# Patient Record
Sex: FEMALE | Race: WHITE | NOT HISPANIC OR LATINO | Employment: FULL TIME | URBAN - METROPOLITAN AREA
[De-identification: names, ages, dates, MRNs, and addresses within clinical notes are randomized per-mention and may not be internally consistent; named-entity substitution may affect disease eponyms.]

---

## 2017-01-19 ENCOUNTER — TELEPHONE (OUTPATIENT)
Dept: FAMILY MEDICINE | Facility: CLINIC | Age: 42
End: 2017-01-19

## 2017-01-19 NOTE — TELEPHONE ENCOUNTER
Franck Lewis called and scheduled a pap with you.  She is also wanting a IUD placed.  She is thinking the copper one?  I let her know we usually do a separate clinic for that but are you willing to place it in the visit?  Please let me know and I will call her.  I let her know too that you can her can talk about this at the visit prior to placing anything.  Thanks!

## 2017-01-25 NOTE — TELEPHONE ENCOUNTER
Franck Villatoro - if she wants the IUD on my schedule, it'll need to be a 40min visit.  If she wants to wait, and do the IUD and the pap during our GYN clinic, you could talk to Vanessa about rescheduling to a Tuesday afternoon (and if she prefers me to do it, would need to check on when I'm next in GYN clinic).    Hope that helps!    Nneka

## 2017-01-26 NOTE — TELEPHONE ENCOUNTER
I called the patient and left  Message with this info.  Asked her to call back if wanting to schedule the IUD on another day or with Vanessa in procedure.

## 2017-02-02 ENCOUNTER — E-VISIT (OUTPATIENT)
Dept: FAMILY MEDICINE | Facility: CLINIC | Age: 42
End: 2017-02-02

## 2017-02-02 DIAGNOSIS — Z30.09 GENERAL COUNSELING AND ADVICE FOR CONTRACEPTIVE MANAGEMENT: Primary | ICD-10-CM

## 2017-02-06 ENCOUNTER — OFFICE VISIT (OUTPATIENT)
Dept: FAMILY MEDICINE | Facility: CLINIC | Age: 42
End: 2017-02-06

## 2017-02-06 VITALS
HEART RATE: 83 BPM | DIASTOLIC BLOOD PRESSURE: 88 MMHG | HEIGHT: 65 IN | WEIGHT: 178.4 LBS | TEMPERATURE: 97.6 F | RESPIRATION RATE: 16 BRPM | SYSTOLIC BLOOD PRESSURE: 127 MMHG | OXYGEN SATURATION: 97 % | BODY MASS INDEX: 29.72 KG/M2

## 2017-02-06 DIAGNOSIS — Z13.9 SCREENING FOR CONDITION: ICD-10-CM

## 2017-02-06 DIAGNOSIS — Z12.4 SCREENING FOR MALIGNANT NEOPLASM OF CERVIX: ICD-10-CM

## 2017-02-06 DIAGNOSIS — Z97.5 IUD (INTRAUTERINE DEVICE) IN PLACE: ICD-10-CM

## 2017-02-06 DIAGNOSIS — Z30.430 ENCOUNTER FOR INSERTION OF INTRAUTERINE CONTRACEPTIVE DEVICE: Primary | ICD-10-CM

## 2017-02-06 DIAGNOSIS — Z30.430 ENCOUNTER FOR INSERTION OF INTRAUTERINE CONTRACEPTIVE DEVICE: ICD-10-CM

## 2017-02-06 LAB — HCG UR QL: NEGATIVE

## 2017-02-06 NOTE — MR AVS SNAPSHOT
After Visit Summary   2/6/2017    Debbie Hood    MRN: [id number]           Patient Information     Date Of Birth          1975        Visit Information        Provider Department      2/6/2017 2:20 PM Nneka Hylton MD [facility]        Today's Diagnoses     Contraceptive management    -  1     Encounter for insertion of intrauterine contraceptive device         Screening for malignant neoplasm of cervix           Care Instructions    IUD AFTERCARE INSTRUCTIONS     1. Uterine cramping is common after IUD placement. You can help relieve the discomfort with heating pads, Tylenol (acetaminophen), Aspirin or Advil (ibuprofen). If your cramping becomes very painful, please call the clinic.     2. Irregular bleeding and spotting is normal for the first few months after the IUD is placed. In some cases, women may experience irregular bleeding or spotting for up to six months after the IUD is placed. This bleeding can be annoying at first but usually will become lighter with the Mirena IUD quickly. Call the clinic if your bleeding is excessive and not getting better.     3. Your period will likely be shorter and lighter with a Mirena IUD. Approximately 40% of women will stop having periods altogether with the Mirena IUD. Your period may be heavier and longer with the Paragard IUD.     4. IUDs do not protect against sexually transmitted infections including the AIDS virus (HIV), warts (HPV), gonorrhea, Chlamydia, and herpes. Condoms should be used to decrease the risk sexually transmitted infections. If you think that you have been exposed to a sexually transmitted infection, please call the clinic.     5. If you had the IUD placed for birth control, the Paragard IUD is effective immediately. The Mirena IUD is effective immediately if it was inserted within seven days after the start of your period. If you have Mirena inserted at any other time during your menstrual cycle, use  another method of birth control, like condoms for at least 7 days.     6. It is possible for the IUD to come out of the uterus. If it does slip out of place, it is most likely to happen in the first few months after being put in. To make sure your IUD is in place, you can feel for the IUD strings between periods. To check for strings, wash your hands. Then, sit or squat down. Place one finger into your vagina until you feel your cervix. It will feel hard and rubbery, like the end of your nose. The string ends should be coming through your cervix. Do not pull on the strings. If the strings feel much longer than before, if you feel the hard plastic part of the IUD, or if you cannot feel the strings at all, the IUD may have moved out of place. Please call the clinic and consider using a back up form of birth control until you are seen.     7. Keep your follow-up appointment for 4-6 weeks after the IUD has been placed.     8. Pregnancy is unlikely after IUD placement, but can happen. If you have early pregnancy symptoms like nausea and vomiting, breast tenderness, frequent urination or abdominal pain, you can take a pregnancy test. Please call the clinic if you have any concerns or if your pregnancy test is positive.     9. The IUD should only be removed by a healthcare provider.     The Mirena IUD should be removed and/or replaced after 5 years.     The Paragard IUD should be removed and/or replaced after 10 years.     Warning Signs   Call the clinic if any of the following occurs:       Severe abdominal pain or cramping       Unusual bleeding       Fever or chills       Foul smelling vaginal discharge       Painful intercourse       Positive pregnancy test.                 Follow-ups after your visit        Who to contact     Please call your clinic at [phone] to:    Ask questions about your health    Make or cancel appointments    Discuss your medicines    Learn about your test results    Speak to your doctor  "  If you have compliments or concerns about an experience at your clinic, or if you wish to file a complaint, please contact [facility] Patient Relations at [phone] or email us at [email or web address]         Additional Information About Your Visit        MyChart Information     [facility] gives you secure access to your electronic health record. If you see a primary care provider, you can also send messages to your care team and make appointments. If you have questions, please call your primary care clinic.  If you do not have a primary care provider, please call [phone] and they will assist you.      [employer] assistant is an electronic gateway that provides easy, online access to your medical records. With [employer] assistant, you can request a clinic appointment, read your test results, renew a prescription or communicate with your care team.     To access your existing account, please contact your [facility] or call [phone] for assistance.        Care EveryWhere ID     This is your Care EveryWhere ID. This could be used by other organizations to access your [facility] medical records  [id number]        Your Vitals Were     Pulse Temperature Respirations Height BMI (Body Mass Index) Pulse Oximetry    83 97.6  F (36.4  C) (Oral) 16 5' 5\" (165.1 cm) 29.69 kg/m2 97%       Blood Pressure from Last 3 Encounters:   02/06/17 127/88   02/25/16 126/89   01/17/16 134/94    Weight from Last 3 Encounters:   02/06/17 178 lb 6.4 oz (80.922 kg)   02/25/16 172 lb 6.4 oz (78.2 kg)   01/11/16 193 lb (87.544 kg)              We Performed the Following     HC LEVONORGESTREL IU 52MG 5 YR     HCG Qualitative Urine (UPT) (Elif's)     HPV High Risk Types DNA Cervical     INSERTION INTRAUTERINE DEVICE     Pap imaged thin layer screen with HPV - recommended age 30 - 65 years (select HPV order below)          Today's Medication Changes          These changes are accurate as of: " 2/6/17  3:15 PM.  If you have any questions, ask your nurse or doctor.               Start taking these medicines.        Dose/Directions    levonorgestrel 20 MCG/24HR IUD   Commonly known as:  MIRENA   Used for:  Encounter for insertion of intrauterine contraceptive device   Started by:  Nneka Hylton MD        Dose:  1 each   1 each (20 mcg) by Intrauterine route continuous   Refills:  0            Where to get your medicines      Some of these will need a paper prescription and others can be bought over the counter.  Ask your nurse if you have questions.     You don't need a prescription for these medications    - levonorgestrel 20 MCG/24HR IUD             Primary Care Provider Office Phone # Fax #    Nneka Hylton [phone] [address] [facility] [address]        Thank you!     Thank you for choosing [facility]  for your care. Our goal is always to provide you with excellent care. Hearing back from our patients is one way we can continue to improve our services. Please take a few minutes to complete the written survey that you may receive in the mail after your visit with us. Thank you!             Your Updated Medication List - Protect others around you: Learn how to safely use, store and throw away your medicines at www.disposemymeds.org.          This list is accurate as of: 2/6/17  3:15 PM.  Always use your most recent med list.                   Brand Name Dispense Instructions for use    docusate sodium 100 MG capsule    COLACE    100 capsule    Take 1 capsule (100 mg) by mouth nightly as needed for constipation       FLONASE 50 MCG/ACT spray   Generic drug:  fluticasone     1 Package    Spray 1-2 sprays into both nostrils daily       ibuprofen 600 MG tablet    ADVIL/MOTRIN    60 tablet    Take 1 tablet (600 mg) by mouth every 6 hours as needed for moderate pain       levonorgestrel 20 MCG/24HR IUD    MIRENA     1 each (20 mcg) by Intrauterine  route continuous       order for DME     1 Units    Equipment being ordered: BP cuff. Measure daily and if 160/110 go to hospital.       prenatal multivitamin  plus iron 27-0.8 MG Tabs per tablet     100 tablet    Take 1 tablet by mouth daily       ZANTAC PO      Take 150 mg by mouth 2 times daily

## 2017-02-06 NOTE — PATIENT INSTRUCTIONS
IUD AFTERCARE INSTRUCTIONS     1. Uterine cramping is common after IUD placement. You can help relieve the discomfort with heating pads, Tylenol (acetaminophen), Aspirin or Advil (ibuprofen). If your cramping becomes very painful, please call the clinic.     2. Irregular bleeding and spotting is normal for the first few months after the IUD is placed. In some cases, women may experience irregular bleeding or spotting for up to six months after the IUD is placed. This bleeding can be annoying at first but usually will become lighter with the Mirena IUD quickly. Call the clinic if your bleeding is excessive and not getting better.     3. Your period will likely be shorter and lighter with a Mirena IUD. Approximately 40% of women will stop having periods altogether with the Mirena IUD. Your period may be heavier and longer with the Paragard IUD.     4. IUDs do not protect against sexually transmitted infections including the AIDS virus (HIV), warts (HPV), gonorrhea, Chlamydia, and herpes. Condoms should be used to decrease the risk sexually transmitted infections. If you think that you have been exposed to a sexually transmitted infection, please call the clinic.     5. If you had the IUD placed for birth control, the Paragard IUD is effective immediately. The Mirena IUD is effective immediately if it was inserted within seven days after the start of your period. If you have Mirena inserted at any other time during your menstrual cycle, use another method of birth control, like condoms for at least 7 days.     6. It is possible for the IUD to come out of the uterus. If it does slip out of place, it is most likely to happen in the first few months after being put in. To make sure your IUD is in place, you can feel for the IUD strings between periods. To check for strings, wash your hands. Then, sit or squat down. Place one finger into your vagina until you feel your cervix. It will feel hard and rubbery, like the end of  your nose. The string ends should be coming through your cervix. Do not pull on the strings. If the strings feel much longer than before, if you feel the hard plastic part of the IUD, or if you cannot feel the strings at all, the IUD may have moved out of place. Please call the clinic and consider using a back up form of birth control until you are seen.     7. Keep your follow-up appointment for 4-6 weeks after the IUD has been placed.     8. Pregnancy is unlikely after IUD placement, but can happen. If you have early pregnancy symptoms like nausea and vomiting, breast tenderness, frequent urination or abdominal pain, you can take a pregnancy test. Please call the clinic if you have any concerns or if your pregnancy test is positive.     9. The IUD should only be removed by a healthcare provider.     The Mirena IUD should be removed and/or replaced after 5 years.     The Paragard IUD should be removed and/or replaced after 10 years.     Warning Signs   Call the clinic if any of the following occurs:       Severe abdominal pain or cramping       Unusual bleeding       Fever or chills       Foul smelling vaginal discharge       Painful intercourse       Positive pregnancy test.

## 2017-02-06 NOTE — PROGRESS NOTES
"SOAP Note     HPI: here for f/u on a few issues  1) due for pap    2) wants IUD again, thinking Mirena, likely not to have more kids at all    3) f/u on BP  BPs at home  124-137/  Only on lisinopril for a total of 4 months after 1st baby, then BP normalized and her weight was down, things got better.  Elevated BP again in second pregnancy (now 9 months PP) and not on meds currently  - denies HA, CP, leg swelling, vision changes  - weight is a little higher than she would like, needs to get back to exercising regularly    4) FHx of Graves' and other thyroid issues, would like to check TSH (last checked 4 yrs ago - normal)  - due for lipids, too  TSH with lipids - future labs    O: /88 mmHg  Pulse 83  Temp(Src) 97.6  F (36.4  C) (Oral)  Resp 16  Ht 5' 5\" (165.1 cm)  Wt 178 lb 6.4 oz (80.922 kg)  BMI 29.69 kg/m2  SpO2 97%  GEN - NAD  CV - RRR, no murmur  Lungs - CTAB  LE - no edema  Psych - no issues, mood stable    A/P:  1) Pap/IUD today (see IUD note below)  2) Essential HTN - discussed restarting med (she would like to hold off for now), trial of DASH diet, increased activity, weight loss  3) check lipids/TSH at future visit (when fasting)      IUD Insertion Note    Debbie Hood is a patient of Nneka Pandey here for IUD placement.   Indication: unwanted fertility    Counselling: Discussed potential side effects of Paragard, including increased bleeding and cramping, as well as the Mirena, including unpredictable spotting and amenorrhea.  Patient aware to check for strings every month.    Consent: Affirmation of informed consent was signed and scanned into the medical record. Risks, benefits and alternatives were discussed including small risk of uterine perforation during insertion. Patient's questions were elicited and answered.   Procedure safety checklist was completed:  Yes  Time Out (Pause for the Cause) completed: Yes    Labs: UPT negative    Patient chooses this IUD type: " Mirena    Technique:   Patient was premedicated with ibuprofen:   No  Uterine position was confirmed by bimanual exam: Yes - retroverted  Using a sterile speculum and equipment, the cervix  was examined.   A GC-Chlamydia probe was collected:   No   A pap was collected (for screening)   Yes  The cervix was cleansed with Betadine prep. Tenaculum was applied to cervix with tension to straighten cervical canal. The uterus was sounded to 8 cm. The Mirena insertion apparatus was prepared and introduced into the cervix without significant resistance.  The IUD was placed in the uterus. The strings were trimmed 3 cm below the cervix.   IUD Lot #: [id number] (6/2019)  EBL: minimal  Complications: No  Tolerance: Pt tolerated procedure well and was in stable condition.     Follow up: Pt was instructed to call if heavy bleeding, severe cramping or foul smelling discharge. May take 400-600 mg ibuprofen TID prn for mild cramping. Pt should return in one month for IUD string check. Pt instructed she requires a new IUD device in 7 years.     Nneka Hylton MD

## 2017-02-08 LAB
COPATH REPORT: NORMAL
PAP: NORMAL

## 2017-02-09 LAB
FINAL DIAGNOSIS: NORMAL
HPV HR 12 DNA CVX QL NAA+PROBE: NEGATIVE
HPV16 DNA SPEC QL NAA+PROBE: NEGATIVE
HPV18 DNA SPEC QL NAA+PROBE: NEGATIVE
SPECIMEN DESCRIPTION: NORMAL

## 2017-02-20 DIAGNOSIS — Z13.9 SCREENING FOR CONDITION: ICD-10-CM

## 2017-02-20 LAB
CHOLEST SERPL-MCNC: 173.8 MG/DL (ref 0–200)
CHOLEST/HDLC SERPL: 3.5 {RATIO} (ref 0–5)
HDLC SERPL-MCNC: 50.3 MG/DL
LDLC SERPL CALC-MCNC: 92 MG/DL (ref 0–129)
TRIGL SERPL-MCNC: 155.2 MG/DL (ref 0–150)
TSH SERPL DL<=0.005 MIU/L-ACNC: 3.43 MU/L (ref 0.4–4)
VLDL CHOLESTEROL: 31 MG/DL (ref 7–32)

## 2017-03-30 ENCOUNTER — OFFICE VISIT (OUTPATIENT)
Dept: FAMILY MEDICINE | Facility: CLINIC | Age: 42
End: 2017-03-30

## 2017-03-30 VITALS
OXYGEN SATURATION: 96 % | HEART RATE: 108 BPM | SYSTOLIC BLOOD PRESSURE: 125 MMHG | TEMPERATURE: 97.8 F | RESPIRATION RATE: 18 BRPM | WEIGHT: 179 LBS | DIASTOLIC BLOOD PRESSURE: 89 MMHG | BODY MASS INDEX: 29.79 KG/M2

## 2017-03-30 DIAGNOSIS — R22.2 SUBCUTANEOUS NODULE OF CHEST WALL: Primary | ICD-10-CM

## 2017-03-30 NOTE — MR AVS SNAPSHOT
After Visit Summary   3/30/2017    Debbie Hood    MRN: [id number]           Patient Information     Date Of Birth          1975        Visit Information        Provider Department      3/30/2017 4:20 PM Nneka Hylton MD [facility]        Today's Diagnoses     Subcutaneous nodule of chest wall    -  1       Follow-ups after your visit        Who to contact     Please call your clinic at [phone] to:    Ask questions about your health    Make or cancel appointments    Discuss your medicines    Learn about your test results    Speak to your doctor   If you have compliments or concerns about an experience at your clinic, or if you wish to file a complaint, please contact [facility] Patient Relations at [phone] or email us at [email or web address]         Additional Information About Your Visit        MyChart Information     [employer] gives you secure access to your electronic health record. If you see a primary care provider, you can also send messages to your care team and make appointments. If you have questions, please call your primary care clinic.  If you do not have a primary care provider, please call [phone] and they will assist you.      [employer] is an electronic gateway that provides easy, online access to your medical records. With [employer], you can request a clinic appointment, read your test results, renew a prescription or communicate with your care team.     To access your existing account, please contact your [facility] or call [phone] for assistance.        Care EveryWhere ID     This is your Care EveryWhere ID. This could be used by other organizations to access your [facility] medical records  [id number]        Your Vitals Were     Pulse Temperature Respirations Pulse Oximetry Breastfeeding? BMI (Body Mass Index)    108 97.8  F (36.6  C) (Oral) 18 96% No 29.79 kg/m2        Blood Pressure from Last 3 Encounters:   03/30/17 125/89   02/06/17 127/88   02/25/16 126/89    Weight from Last 3 Encounters:   03/30/17 179 lb (81.2 kg)   02/06/17 178 lb 6.4 oz (80.9 kg)   02/25/16 172 lb 6.4 oz (78.2 kg)              Today, you had the following     No orders found for display       Primary Care Provider Office Phone # Fax #    Nneka Hylton [phone] [address] [facility] [address]        Thank you!     Thank you for choosing [facility]  for your care. Our goal is always to provide you with excellent care. Hearing back from our patients is one way we can continue to improve our services. Please take a few minutes to complete the written survey that you may receive in the mail after your visit with us. Thank you!             Your Updated Medication List - Protect others around you: Learn how to safely use, store and throw away your medicines at www.disposemymeds.org.          This list is accurate as of: 3/30/17 11:59 PM.  Always use your most recent med list.                   Brand Name Dispense Instructions for use    docusate sodium 100 MG capsule    COLACE    100 capsule    Take 1 capsule (100 mg) by mouth nightly as needed for constipation       FLONASE 50 MCG/ACT spray   Generic drug:  fluticasone     1 Package    Spray 1-2 sprays into both nostrils daily       ibuprofen 600 MG tablet    ADVIL/MOTRIN    60 tablet    Take 1 tablet (600 mg) by mouth every 6 hours as needed for moderate pain       levonorgestrel 20 MCG/24HR IUD    MIRENA     1 each (20 mcg) by Intrauterine route continuous       order for DME     1 Units    Equipment being ordered: BP cuff. Measure daily and if 160/110 go to hospital.       prenatal multivitamin  plus iron 27-0.8 MG Tabs per tablet     100 tablet    Take 1 tablet by mouth daily       ZANTAC PO      Take 150 mg by mouth 2 times daily

## 2017-03-30 NOTE — PROGRESS NOTES
HPI:       Debbie Hood is a 41 year old who presents for the following  Patient presents with:  Mass: near breast        Concern: Breast Lump - though not sure if it's technically on the breast   Description of the problem:  Pt presents today with a small lump below right breast pt denies any pain, redness or swelling     When did it start?: 6+ months, hasn't changed, thought she should get it looked at finally    Intensity: not painful    Progression of Symptoms:  same    Therapies Tried Nothing      H/o BrCA in PGM (late 70s, early 80s)  No BrCA on mom's side of the family    Problem, Medication and Allergy Lists were reviewed and are current.  Patient is an established patient of this clinic.         Review of Systems:   Review of Systems   Constitutional: Negative for activity change and appetite change.   Skin:        See HPI - breast lump or chest wall lump   Neurological: Negative.    Psychiatric/Behavioral: Negative.              Physical Exam:   Patient Vitals for the past 24 hrs:   BP Temp Temp src Pulse Resp SpO2 Weight   03/30/17 1610 125/89 - - - - - -   03/30/17 1609 141/88 97.8  F (36.6  C) Oral 108 18 96 % 179 lb (81.2 kg)     Body mass index is 29.79 kg/(m^2).  Vitals were reviewed and were normal     Physical Exam   Constitutional: She is oriented to person, place, and time. She appears well-developed and well-nourished.   Pulmonary/Chest: Effort normal.       Musculoskeletal: Normal range of motion.   Neurological: She is alert and oriented to person, place, and time.   Skin:   Small (5mm), mobile, smooth, superficial subcutaneous nodule just below the R breast (does not appear to be near breast tissue)  Normal breast exam otherwise   Psychiatric: She has a normal mood and affect. Her behavior is normal. Judgment and thought content normal.         Results:       Assessment and Plan     1. Subcutaneous nodule of chest wall  Reassurance  Not associated with breast tissue  Hasn't changed  "in 6 months - likely is a cyst or fibroma  No need to do anything with it - if changes size, reassess    Discussed breast cancer screening guidelines - her h/o is not considered \"high risk\" for BrCA - can start at age 50, but offered to refer in 40s if she prefers (understanding limitations of screening and possible false-positive in 40s)    There are no discontinued medications.  Options for treatment and follow-up care were reviewed with the patient. Debbie Hood  engaged in the decision making process and verbalized understanding of the options discussed and agreed with the final plan.    Nneka Hylton MD      "

## 2017-03-31 ASSESSMENT — ENCOUNTER SYMPTOMS
PSYCHIATRIC NEGATIVE: 1
APPETITE CHANGE: 0
NEUROLOGICAL NEGATIVE: 1
ACTIVITY CHANGE: 0

## 2017-05-09 ENCOUNTER — OFFICE VISIT (OUTPATIENT)
Dept: FAMILY MEDICINE | Facility: CLINIC | Age: 42
End: 2017-05-09

## 2017-05-09 VITALS
HEART RATE: 92 BPM | RESPIRATION RATE: 16 BRPM | SYSTOLIC BLOOD PRESSURE: 127 MMHG | DIASTOLIC BLOOD PRESSURE: 86 MMHG | TEMPERATURE: 98.4 F | OXYGEN SATURATION: 97 % | BODY MASS INDEX: 29.59 KG/M2 | WEIGHT: 177.8 LBS

## 2017-05-09 DIAGNOSIS — T83.32XA MALPOSITIONED INTRAUTERINE DEVICE, INITIAL ENCOUNTER: Primary | ICD-10-CM

## 2017-05-09 DIAGNOSIS — Z30.432 ENCOUNTER FOR IUD REMOVAL: ICD-10-CM

## 2017-05-09 ASSESSMENT — ENCOUNTER SYMPTOMS
RESPIRATORY NEGATIVE: 1
CONSTITUTIONAL NEGATIVE: 1
ABDOMINAL PAIN: 0
CARDIOVASCULAR NEGATIVE: 1

## 2017-05-09 NOTE — Clinical Note
JASWANT Lewis was in today and her IUD was removed due to expulsion.  She will be following up in gyn clinic for replacement.  Thanks, Nicolette

## 2017-05-09 NOTE — PROGRESS NOTES
HPI:       Debbie Hood is a 41 year old who presents for the following  Patient presents with:  IUD: check up on IUD. mild nausea. She can feel the strings and is concerned      LMP:  17, period was heavy but not abnormal - checked IUD strings after menses and was able to feel base of IUD over the weekend.      Mirena IUD placed on 17.  Had Paraguard previously between birth of her children.      .     No heavy bleeding or cramping.          Problem, Medication and Allergy Lists were reviewed and are current.  Patient is an established patient of this clinic.             Review of Systems:   Review of Systems   Constitutional: Negative.    Respiratory: Negative.    Cardiovascular: Negative.    Gastrointestinal: Negative for abdominal pain.   Genitourinary: Negative for menstrual problem, pelvic pain, vaginal bleeding and vaginal pain.             Physical Exam:   Patient Vitals for the past 24 hrs:   BP Temp Temp src Pulse Resp SpO2 Weight   17 0910 127/86 98.4  F (36.9  C) Oral 92 16 97 % 177 lb 12.8 oz (80.6 kg)     Body mass index is 29.59 kg/(m^2).  Vitals were reviewed and were normal     Physical Exam   Constitutional: She appears well-nourished. No distress.   Genitourinary: Uterus is not tender. Cervix exhibits no discharge and no friability.   Genitourinary Comments: Base of IUD visualized extending out of cervical os.    IUD removed without complication utilizing ring forceps.           Assessment and Plan     Debbie was seen today for iud.    Diagnoses and all orders for this visit:    Malpositioned intrauterine device, initial encounter (H)  -     Colposcopy/[facility]necology Clinic[facility]ospit[facility] INTERNAL REFERRAL    Encounter for IUD removal  Mirena IUD removed due to malpositioning/expulsion of IUD.      Patient to follow-up in gyn procedure clinic for replacement of IUD.        Options for treatment and follow-up care were reviewed with the patient. Debbie Hood  engaged  in the decision making process and verbalized understanding of the options discussed and agreed with the final plan.    STEVE Campos CNP

## 2017-05-09 NOTE — PATIENT INSTRUCTIONS
Here is the plan from today's visit    1. Malpositioned intrauterine device, initial encounter (H)  - Colposcopy/Gynecology Clinic-[facility]ospit[facility] INTERNAL REFERRAL    2. Encounter for IUD removal          Thank you for coming to [facility] today.  Lab Testing:  **If you had lab testing today and your results are reassuring or normal they will be mailed to you or sent through T[employer] within 7 days.   **If the lab tests need quick action we will call you with the results.  The phone number we will call with results is # [phone] (home) [phone] (work). If this is not the best number please call our clinic and change the number.  Medication Refills:  If you need any refills please call your pharmacy and they will contact us.   If you need to  your refill at a new pharmacy, please contact the new pharmacy directly. The new pharmacy will help you get your medications transferred faster.   Scheduling:  If you have any concerns about today's visit or wish to schedule another appointment please call our office during normal business hours [phone] (8-5:00 M-F)  If a referral was made to a [facility] and you don't get a call from central scheduling please call [phone].  If a Mammogram was ordered for you at [facility] call [phone] to schedule or change your appointment.  If you had an XRay/CT/Ultrasound/MRI ordered the number is [phone] to schedule or change your radiology appointment.   Medical Concerns:  If you have urgent medical concerns please call [phone] at any time of the day.  If you have a medical emergency please call [phone].

## 2017-05-09 NOTE — MR AVS SNAPSHOT
After Visit Summary   5/9/2017    Debbie Hood    MRN: [id number]           Patient Information     Date Of Birth          1975        Visit Information        Provider Department      5/9/2017 9:20 AM Nicolette Martin APRN CNP [facility]        Today's Diagnoses     Malpositioned intrauterine device, initial encounter (H)    -  1    Encounter for IUD removal          Care Instructions    Here is the plan from today's visit    1. Malpositioned intrauterine device, initial encounter (H)  - Colposcopy/Gynecology Clinic-[facility] INTERNAL REFERRAL    2. Encounter for IUD removal          Thank you for coming to [facility] today.  Lab Testing:  **If you had lab testing today and your results are reassuring or normal they will be mailed to you or sent through MD.Voice within 7 days.   **If the lab tests need quick action we will call you with the results.  The phone number we will call with results is # [phone] (home) [phone] (work). If this is not the best number please call our clinic and change the number.  Medication Refills:  If you need any refills please call your pharmacy and they will contact us.   If you need to  your refill at a new pharmacy, please contact the new pharmacy directly. The new pharmacy will help you get your medications transferred faster.   Scheduling:  If you have any concerns about today's visit or wish to schedule another appointment please call our office during normal business hours [phone] (8-5:00 M-F)  If a referral was made to a [facility] and you don't get a call from central scheduling please call [phone].  If a Mammogram was ordered for you at [facility] call [phone] to schedule or change your appointment.  If you had an XRay/CT/Ultrasound/MRI ordered the number is [phone] to schedule or change your radiology appointment.   Medical Concerns:  If you have urgent  medical concerns please call [phone] at any time of the day.  If you have a medical emergency please call 911.          Follow-ups after your visit        Additional Services     Colposcopy/Gynecology Clinic-[facility] INTERNAL REFERRAL       What procedure: IUD placement  Urgency of Appointment: Next Available  Would this patient benefit from pre-medication with Ativan for procedural anxiety? No  Is this patient post-menopausal? No                  Who to contact     Please call your clinic at [phone] to:    Ask questions about your health    Make or cancel appointments    Discuss your medicines    Learn about your test results    Speak to your doctor   If you have compliments or concerns about an experience at your clinic, or if you wish to file a complaint, please contact [facility] Patient Relations at [phone] or email us at [email or web address]         Additional Information About Your Visit        [employer] Information     [employer] gives you secure access to your electronic health record. If you see a primary care provider, you can also send messages to your care team and make appointments. If you have questions, please call your primary care clinic.  If you do not have a primary care provider, please call [phone] and they will assist you.      [employer] is an electronic gateway that provides easy, online access to your medical records. With [employer], you can request a clinic appointment, read your test results, renew a prescription or communicate with your care team.     To access your existing account, please contact your [facility] or call [phone] for assistance.        Care EveryWhere ID     This is your Care EveryWhere ID. This could be used by other organizations to access your [place] medical records  [id number]        Your Vitals Were     Pulse Temperature Respirations Last Period Pulse Oximetry BMI (Body Mass  Index)    92 98.4  F (36.9  C) (Oral) 16 05/02/2017 (Exact Date) 97% 29.59 kg/m2       Blood Pressure from Last 3 Encounters:   05/09/17 127/86   03/30/17 125/89   02/06/17 127/88    Weight from Last 3 Encounters:   05/09/17 177 lb 12.8 oz (80.6 kg)   03/30/17 179 lb (81.2 kg)   02/06/17 178 lb 6.4 oz (80.9 kg)              We Performed the Following     Colposcopy/Gynecology Clinic-[facility] INTERNAL REFERRAL        Primary Care Provider Office Phone # Fax #    Nneka Hylton [phone] [address] [facility] [address]        Thank you!     Thank you for choosing [facility]  for your care. Our goal is always to provide you with excellent care. Hearing back from our patients is one way we can continue to improve our services. Please take a few minutes to complete the written survey that you may receive in the mail after your visit with us. Thank you!             Your Updated Medication List - Protect others around you: Learn how to safely use, store and throw away your medicines at www.disposemymeds.org.          This list is accurate as of: 5/9/17  9:41 AM.  Always use your most recent med list.                   Brand Name Dispense Instructions for use    docusate sodium 100 MG capsule    COLACE    100 capsule    Take 1 capsule (100 mg) by mouth nightly as needed for constipation       FLONASE 50 MCG/ACT spray   Generic drug:  fluticasone     1 Package    Spray 1-2 sprays into both nostrils daily Reported on 5/9/2017       ibuprofen 600 MG tablet    ADVIL/MOTRIN    60 tablet    Take 1 tablet (600 mg) by mouth every 6 hours as needed for moderate pain       levonorgestrel 20 MCG/24HR IUD    MIRENA     1 each (20 mcg) by Intrauterine route continuous       order for DME     1 Units    Equipment being ordered: BP cuff. Measure daily and if 160/110 go to hospital.       prenatal multivitamin  plus iron 27-0.8 MG Tabs per tablet     100 tablet    Take 1 tablet by  mouth daily       ZANTAC PO      Take 150 mg by mouth 2 times daily Reported on 5/9/2017

## 2017-05-23 ENCOUNTER — OFFICE VISIT (OUTPATIENT)
Dept: FAMILY MEDICINE | Facility: CLINIC | Age: 42
End: 2017-05-23

## 2017-05-23 DIAGNOSIS — Z30.430 ENCOUNTER FOR INSERTION OF INTRAUTERINE CONTRACEPTIVE DEVICE: Primary | ICD-10-CM

## 2017-05-23 DIAGNOSIS — Z97.5 IUD (INTRAUTERINE DEVICE) IN PLACE: ICD-10-CM

## 2017-05-23 LAB — HCG UR QL: NEGATIVE

## 2017-05-23 NOTE — MR AVS SNAPSHOT
After Visit Summary   5/23/2017    Debbie Hood    MRN: [id number]           Patient Information     Date Of Birth          1975        Visit Information        Provider Department      5/23/2017 2:20 PM Alice Luna MD [facility]        Today's Diagnoses     Encounter for birth control    -  1      Care Instructions    Here is the plan from today's visit    1. Encounter for birth control  - HCG Qualitative Urine (UPT) ([facility])      IUD AFTERCARE INSTRUCTIONS     1. Uterine cramping is common after IUD placement. You can help relieve the discomfort with heating pads, Tylenol (acetaminophen), Aspirin or Advil (ibuprofen). If your cramping becomes very painful, please call the clinic.     2. Irregular bleeding and spotting is normal for the first few months after the IUD is placed. In some cases, women may experience irregular bleeding or spotting for up to six months after the IUD is placed. This bleeding can be annoying at first but usually will become lighter with the Mirena IUD quickly. Call the clinic if your bleeding is excessive and not getting better.     3. Your period will likely be shorter and lighter with a Mirena IUD. Approximately 40% of women will stop having periods altogether with the Mirena IUD. Your period may be heavier and longer with the Paragard IUD.     4. IUDs do not protect against sexually transmitted infections including the AIDS virus (HIV), warts (HPV), gonorrhea, Chlamydia, and herpes. Condoms should be used to decrease the risk sexually transmitted infections. If you think that you have been exposed to a sexually transmitted infection, please call the clinic.     5. If you had the IUD placed for birth control, the Paragard IUD is effective immediately. The Mirena IUD is effective immediately if it was inserted within seven days after the start of your period. If you have Mirena inserted at any other time during your menstrual cycle, use  another method of birth control, like condoms for at least 7 days.     6. It is possible for the IUD to come out of the uterus. If it does slip out of place, it is most likely to happen in the first few months after being put in. To make sure your IUD is in place, you can feel for the IUD strings between periods. To check for strings, wash your hands. Then, sit or squat down. Place one finger into your vagina until you feel your cervix. It will feel hard and rubbery, like the end of your nose. The string ends should be coming through your cervix. Do not pull on the strings. If the strings feel much longer than before, if you feel the hard plastic part of the IUD, or if you cannot feel the strings at all, the IUD may have moved out of place. Please call the clinic and consider using a back up form of birth control until you are seen.     7. Keep your follow-up appointment for 4-6 weeks after the IUD has been placed.     8. Pregnancy is unlikely after IUD placement, but can happen. If you have early pregnancy symptoms like nausea and vomiting, breast tenderness, frequent urination or abdominal pain, you can take a pregnancy test. Please call the clinic if you have any concerns or if your pregnancy test is positive.     9. The IUD should only be removed by a healthcare provider.     The Mirena IUD should be removed and/or replaced after 5 years.     The Paragard IUD should be removed and/or replaced after 10 years.     Warning Signs   Call the clinic if any of the following occurs:       Severe abdominal pain or cramping       Unusual bleeding       Fever or chills       Foul smelling vaginal discharge       Painful intercourse       Positive pregnancy test.               Please call or return to clinic if your symptoms don't go away.    Follow up plan  Please make a clinic appointment for follow up with your primary physician Nneka Hylton in 1 week for string check.    Thank you for coming to [facility]  today.  Lab Testing:  **If you had lab testing today and your results are reassuring or normal they will be mailed to you or sent through [employer] within 7 days.   **If the lab tests need quick action we will call you with the results.  The phone number we will call with results is # [phone] (home) [phone] (work). If this is not the best number please call our clinic and change the number.  Medication Refills:  If you need any refills please call your pharmacy and they will contact us.   If you need to  your refill at a new pharmacy, please contact the new pharmacy directly. The new pharmacy will help you get your medications transferred faster.   Scheduling:  If you have any concerns about today's visit or wish to schedule another appointment please call our office during normal business hours [phone] (8-5:00 M-F)    Ray/CT/Ultrasound/MRI ordered the number is [phone] to schedule or change your radiology appointment.   Medical Concerns:  If you have urgent medical concerns please call [phone] at any time of the day.  If you have a medical emergency please call 911.          Follow-ups after your visit        Who to contact     Please call your clinic at [phone] to:    Ask questions about your health    Make or cancel appointments    Discuss your medicines    Learn about your test results    Speak to your doctor   If you have compliments or concerns about an experience at your clinic, or if you wish to file a complaint, please contact [facility] Patient Relations at [phone] or email us at [email or web address]         Additional Information About Your Visit        [employer] Information     [employer] gives you secure access to your electronic health record. If you see a primary care provider, you can also send messages to your care team and make appointments. If you have questions, please call your primary care clinic.  If you do not have a  primary care provider, please call [phone] and they will assist you.      [employer] is an electronic gateway that provides easy, online access to your medical records. With [employer], you can request a clinic appointment, read your test results, renew a prescription or communicate with your care team.     To access your existing account, please contact your [facility] or call [phone] for assistance.        Care EveryWhere ID     This is your Care EveryWhere ID. This could be used by other organizations to access your [place] medical records  [id number]        Your Vitals Were     Last Period                   05/02/2017 (Exact Date)            Blood Pressure from Last 3 Encounters:   05/09/17 127/86   03/30/17 125/89   02/06/17 127/88    Weight from Last 3 Encounters:   05/09/17 177 lb 12.8 oz (80.6 kg)   03/30/17 179 lb (81.2 kg)   02/06/17 178 lb 6.4 oz (80.9 kg)              We Performed the Following     HCG Qualitative Urine (UPT) ([facility])        Primary Care Provider Office Phone # Fax #    Nneka Hylton [phone] [address] [facility] [address]        Thank you!     Thank you for choosing [facility]  for your care. Our goal is always to provide you with excellent care. Hearing back from our patients is one way we can continue to improve our services. Please take a few minutes to complete the written survey that you may receive in the mail after your visit with us. Thank you!             Your Updated Medication List - Protect others around you: Learn how to safely use, store and throw away your medicines at www.disposemymeds.org.          This list is accurate as of: 5/23/17  3:18 PM.  Always use your most recent med list.                   Brand Name Dispense Instructions for use    docusate sodium 100 MG capsule    COLACE    100 capsule    Take 1 capsule (100 mg) by mouth nightly as needed for constipation        FLONASE 50 MCG/ACT spray   Generic drug:  fluticasone     1 Package    Spray 1-2 sprays into both nostrils daily Reported on 5/9/2017       ibuprofen 600 MG tablet    ADVIL/MOTRIN    60 tablet    Take 1 tablet (600 mg) by mouth every 6 hours as needed for moderate pain       levonorgestrel 20 MCG/24HR IUD    MIRENA     1 each (20 mcg) by Intrauterine route continuous       order for DME     1 Units    Equipment being ordered: BP cuff. Measure daily and if 160/110 go to hospital.       prenatal multivitamin  plus iron 27-0.8 MG Tabs per tablet     100 tablet    Take 1 tablet by mouth daily       ZANTAC PO      Take 150 mg by mouth 2 times daily Reported on 5/9/2017

## 2017-05-23 NOTE — PATIENT INSTRUCTIONS
Here is the plan from today's visit    1. Encounter for birth control  - HCG Qualitative Urine (UPT) ([facility])      IUD AFTERCARE INSTRUCTIONS     1. Uterine cramping is common after IUD placement. You can help relieve the discomfort with heating pads, Tylenol (acetaminophen), Aspirin or Advil (ibuprofen). If your cramping becomes very painful, please call the clinic.     2. Irregular bleeding and spotting is normal for the first few months after the IUD is placed. In some cases, women may experience irregular bleeding or spotting for up to six months after the IUD is placed. This bleeding can be annoying at first but usually will become lighter with the Mirena IUD quickly. Call the clinic if your bleeding is excessive and not getting better.     3. Your period will likely be shorter and lighter with a Mirena IUD. Approximately 40% of women will stop having periods altogether with the Mirena IUD. Your period may be heavier and longer with the Paragard IUD.     4. IUDs do not protect against sexually transmitted infections including the AIDS virus (HIV), warts (HPV), gonorrhea, Chlamydia, and herpes. Condoms should be used to decrease the risk sexually transmitted infections. If you think that you have been exposed to a sexually transmitted infection, please call the clinic.     5. If you had the IUD placed for birth control, the Paragard IUD is effective immediately. The Mirena IUD is effective immediately if it was inserted within seven days after the start of your period. If you have Mirena inserted at any other time during your menstrual cycle, use another method of birth control, like condoms for at least 7 days.     6. It is possible for the IUD to come out of the uterus. If it does slip out of place, it is most likely to happen in the first few months after being put in. To make sure your IUD is in place, you can feel for the IUD strings between periods. To check for strings, wash your hands. Then, sit or  squat down. Place one finger into your vagina until you feel your cervix. It will feel hard and rubbery, like the end of your nose. The string ends should be coming through your cervix. Do not pull on the strings. If the strings feel much longer than before, if you feel the hard plastic part of the IUD, or if you cannot feel the strings at all, the IUD may have moved out of place. Please call the clinic and consider using a back up form of birth control until you are seen.     7. Keep your follow-up appointment for 4-6 weeks after the IUD has been placed.     8. Pregnancy is unlikely after IUD placement, but can happen. If you have early pregnancy symptoms like nausea and vomiting, breast tenderness, frequent urination or abdominal pain, you can take a pregnancy test. Please call the clinic if you have any concerns or if your pregnancy test is positive.     9. The IUD should only be removed by a healthcare provider.     The Mirena IUD should be removed and/or replaced after 5 years.     The Paragard IUD should be removed and/or replaced after 10 years.     Warning Signs   Call the clinic if any of the following occurs:       Severe abdominal pain or cramping       Unusual bleeding       Fever or chills       Foul smelling vaginal discharge       Painful intercourse       Positive pregnancy test.               Please call or return to clinic if your symptoms don't go away.    Follow up plan  Please make a clinic appointment for follow up with your primary physician nNeka Hylton in 1 week for string check.    Thank you for coming to [facility] today.  Lab Testing:  **If you had lab testing today and your results are reassuring or normal they will be mailed to you or sent through [employer] within 7 days.   **If the lab tests need quick action we will call you with the results.  The phone number we will call with results is # [phone] (home) [phone] (work). If this is not the best number please call our  clinic and change the number.  Medication Refills:  If you need any refills please call your pharmacy and they will contact us.   If you need to  your refill at a new pharmacy, please contact the new pharmacy directly. The new pharmacy will help you get your medications transferred faster.   Scheduling:  If you have any concerns about today's visit or wish to schedule another appointment please call our office during normal business hours [phone] (8-5:00 M-F)    Ray/CT/Ultrasound/MRI ordered the number is [phone] to schedule or change your radiology appointment.   Medical Concerns:  If you have urgent medical concerns please call [phone] at any time of the day.  If you have a medical emergency please call [phone].

## 2017-11-08 ENCOUNTER — OFFICE VISIT (OUTPATIENT)
Dept: FAMILY MEDICINE | Facility: CLINIC | Age: 42
End: 2017-11-08

## 2017-11-08 VITALS
TEMPERATURE: 97.9 F | SYSTOLIC BLOOD PRESSURE: 126 MMHG | DIASTOLIC BLOOD PRESSURE: 87 MMHG | RESPIRATION RATE: 16 BRPM | OXYGEN SATURATION: 99 % | BODY MASS INDEX: 29.89 KG/M2 | HEART RATE: 78 BPM | WEIGHT: 179.6 LBS

## 2017-11-08 DIAGNOSIS — N89.8 VAGINAL DISCHARGE: ICD-10-CM

## 2017-11-08 DIAGNOSIS — Z23 NEED FOR INFLUENZA VACCINATION: ICD-10-CM

## 2017-11-08 DIAGNOSIS — Z30.431 IUD CHECK UP: Primary | ICD-10-CM

## 2017-11-08 DIAGNOSIS — I10 BENIGN ESSENTIAL HYPERTENSION: ICD-10-CM

## 2017-11-08 LAB
BACTERIA: NORMAL
CLUE CELLS: NORMAL
MOTILE TRICHOMONAS: NEGATIVE
ODOR: NORMAL
PH WET PREP: 4.5
WBC WET PREP: <2
YEAST: NORMAL

## 2017-11-08 RX ORDER — TRIAMCINOLONE ACETONIDE 55 UG/1
2 SPRAY, METERED NASAL DAILY
COMMUNITY

## 2017-11-08 ASSESSMENT — ENCOUNTER SYMPTOMS
FEVER: 0
NAUSEA: 0
COUGH: 0
SHORTNESS OF BREATH: 0
CHILLS: 0
ABDOMINAL PAIN: 0
DYSURIA: 0
DIFFICULTY URINATING: 0
VOMITING: 0

## 2017-11-08 NOTE — PROGRESS NOTES
Preceptor Attestation:   Patient seen and discussed with the resident. Assessment and plan reviewed with resident and agreed upon.   Supervising Physician:  Chan Tompkins MD  [facility]

## 2017-11-08 NOTE — MR AVS SNAPSHOT
After Visit Summary   11/8/2017    Debbie Hood    MRN: [id number]           Patient Information     Date Of Birth          1975        Visit Information        Provider Department      11/8/2017 4:00 PM Nneka Lama MD [facility]        Today's Diagnoses     IUD check up    -  1    Vaginal discharge        Benign essential hypertension          Care Instructions    Here is the plan from today's visit    1. IUD check up  IUD in place, please assess monthly     2. Vaginal discharge  - Wet Prep ([facility])    3. Benign essential hypertension- Controlled on repeat     Please call or return to clinic if your symptoms don't go away.    Follow up plan  Follow up as needed    Thank you for coming to [facility] today.  Lab Testing:  **If you had lab testing today and your results are reassuring or normal they will be mailed to you or sent through [facility] within 7 days.   **If the lab tests need quick action we will call you with the results.  The phone number we will call with results is # [phone] (home) [phone] (work). If this is not the best number please call our clinic and change the number.  Medication Refills:  If you need any refills please call your pharmacy and they will contact us.   If you need to  your refill at a new pharmacy, please contact the new pharmacy directly. The new pharmacy will help you get your medications transferred faster.   Scheduling:  If you have any concerns about today's visit or wish to schedule another appointment please call our office during normal business hours [phone] (8-5:00 M-F)  If a referral was made to a [facility] and you don't get a call from central scheduling please call [phone].  If a Mammogram was ordered for you at [facility] call [phone] to schedule or change your appointment.  If you had an XRay/CT/Ultrasound/MRI ordered the number is [phone] to  schedule or change your radiology appointment.   Medical Concerns:  If you have urgent medical concerns please call [phone] at any time of the day.            Follow-ups after your visit        Who to contact     Please call your clinic at [phone] to:    Ask questions about your health    Make or cancel appointments    Discuss your medicines    Learn about your test results    Speak to your doctor   If you have compliments or concerns about an experience at your clinic, or if you wish to file a complaint, please contact [facility] Patient Relations at [phone] or email us at [email or web address]         Additional Information About Your Visit        Pins Information     Pins gives you secure access to your electronic health record. If you see a primary care provider, you can also send messages to your care team and make appointments. If you have questions, please call your primary care clinic.  If you do not have a primary care provider, please call [phone] and they will assist you.      Pins is an electronic gateway that provides easy, online access to your medical records. With Pins, you can request a clinic appointment, read your test results, renew a prescription or communicate with your care team.     To access your existing account, please contact your [facility] or call [phone] for assistance.        Care EveryWhere ID     This is your Care EveryWhere ID. This could be used by other organizations to access your [facility] medical records  [id number]        Your Vitals Were     Pulse Temperature Respirations Pulse Oximetry Breastfeeding? BMI (Body Mass Index)    78 97.9  F (36.6  C) (Oral) 16 99% No 29.89 kg/m2       Blood Pressure from Last 3 Encounters:   11/08/17 126/87   05/09/17 127/86   03/30/17 125/89    Weight from Last 3 Encounters:   11/08/17 179 lb 9.6 oz (81.5 kg)   05/09/17 177 lb 12.8 oz (80.6  kg)   03/30/17 179 lb (81.2 kg)              We Performed the Following     Wet Prep ([facility])          Today's Medication Changes          These changes are accurate as of: 11/8/17  4:32 PM.  If you have any questions, ask your nurse or doctor.               Stop taking these medicines if you haven't already. Please contact your care team if you have questions.     docusate sodium 100 MG capsule   Commonly known as:  COLACE   Stopped by:  Nneka Lama MD           FLONASE 50 MCG/ACT spray   Generic drug:  fluticasone   Stopped by:  Nneka Lama MD           prenatal multivitamin plus iron 27-0.8 MG Tabs per tablet   Stopped by:  Nneka Lama MD           ZANTAC PO   Stopped by:  Nneka Lama MD                    Primary Care Provider Office Phone # Fax #    Nneka Hylton [phone]       [address] [address]        Equal Access to Services     [facility]: Hadii antoine greene hadasho Soomaali, waaxda luqadaha, qaybta kaalmada adeegyada, james edmondson . So [facility] [phone].    ATENCIÓN: Si yurila espmartine, tiene a townsend disposición servicios gratuitos de asistencia lingüística. [facility] [phone].    We comply with applicable federal civil rights laws and [place] laws. We do not discriminate on the basis of race, color, national origin, age, disability, sex, sexual orientation, or gender identity.            Thank you!     Thank you for choosing [facility]  for your care. Our goal is always to provide you with excellent care. Hearing back from our patients is one way we can continue to improve our services. Please take a few minutes to complete the written survey that you may receive in the mail after your visit with us. Thank you!             Your Updated Medication List - Protect others around you: Learn how to safely use, store and throw away your medicines at www.disposemymeds.org.           This list is accurate as of: 11/8/17  4:32 PM.  Always use your most recent med list.                   Brand Name Dispense Instructions for use Diagnosis    ibuprofen 600 MG tablet    ADVIL/MOTRIN    60 tablet    Take 1 tablet (600 mg) by mouth every 6 hours as needed for moderate pain    Vaginal delivery       levonorgestrel 20 MCG/24HR IUD    MIRENA     1 each (20 mcg) by Intrauterine route continuous    Encounter for insertion of intrauterine contraceptive device       order for DME     1 Units    Equipment being ordered: BP cuff. Measure daily and if 160/110 go to hospital.    Elevated blood pressure affecting pregnancy in third trimester, antepartum       triamcinolone 55 MCG/ACT Inhaler    NASACORT     Spray 2 sprays into both nostrils daily

## 2017-11-08 NOTE — PROGRESS NOTES
HPI:       Debbie Hood is a 42 year old who presents for the following  Patient presents with:  IUD: check, feels like device is coming out     Had mirena placed in May 2017 with no complaints. She has not been able to feel the strings this month and is concerned it fell out since this has occurred in the past. Vaginal discharge has recently been more than normal and odor that is unpleasant. No concern for STD's. She is monogamous with her . LMP: 11/13-11/17 and lasts 3-5 days with normal flow.      HTN: previous history of elevated BP in past. She checks her BP every month and has been normal.     Problem, Medication and Allergy Lists were reviewed and are current.  Patient is   an established patient of this clinic.,   Past Medical History:   Diagnosis Date     Hypertension     borderline high BP few years back while on OCP, now normal.     Hypertension in pregnancy, pre-existing 2/26/2013    Mild diastolic HTN - documented prior to pregnancy.  Baseline labs checked and normal.  Baseline 24-hr urine protein = 0.07g/24hr.      Mastitis, acute 10/2013     Preeclampsia complicating hypertension 10/2013    IOL at term   ,   Family History     Problem (# of Occurrences) Relation (Name,Age of Onset)    Breast Cancer (1) Paternal Grandmother    Depression (1) Sister    Hypertension (1) Father    Thyroid Disease (1) Maternal Grandmother       and   Social History     Social History     Marital status:      Spouse name: Khai     Number of children: 1     Years of education: 18     Occupational History      [employer]     Social History Main Topics     Smoking status: Former Smoker     Types: Cigarettes     Quit date: 8/14/2008     Smokeless tobacco: Never Used      Comment: 5 cigs a day     Alcohol use 0.0 oz/week      Comment: 5 drinks a week     Drug use: No     Sexual activity: Yes     Partners: Male     Other Topics Concern     None     Social History Narrative    Lives with   and son; 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   Respiratory: Negative for cough and shortness of breath.    Gastrointestinal: Negative for abdominal pain, nausea and vomiting.   Genitourinary: Positive for vaginal discharge. Negative for difficulty urinating, dysuria, menstrual problem, pelvic pain, vaginal bleeding and vaginal pain.             Physical Exam:     Patient Vitals for the past 24 hrs:   BP Temp Temp src Pulse Resp SpO2 Weight   11/08/17 1627 126/87 - - - - - -   11/08/17 1557 (!) 132/93 97.9  F (36.6  C) Oral 78 16 99 % 179 lb 9.6 oz (81.5 kg)     Body mass index is 29.89 kg/(m^2).  Vital signs normal on repeat      Physical Exam   Constitutional: She appears well-developed and well-nourished. No distress.   HENT:   Head: Normocephalic and atraumatic.   Eyes: Conjunctivae are normal. No scleral icterus.   Cardiovascular: Normal rate, regular rhythm and normal heart sounds.    No murmur heard.  Pulmonary/Chest: Effort normal and breath sounds normal. She has no wheezes.   Abdominal: Soft. Bowel sounds are normal. She exhibits no distension. There is no tenderness.   Genitourinary: Vagina normal. There is no lesion on the right labia. There is no lesion on the left labia. Cervix exhibits no discharge and no friability.   Genitourinary Comments: IUD strings in place    Skin: Skin is warm and dry. No rash noted.   Psychiatric: She has a normal mood and affect. Her behavior is normal.         Results:      Results from the last 24 hours  Results for orders placed or performed in visit on 11/08/17 (from the past 24 hour(s))   Wet Prep ([facility])   Result Value Ref Range    Yeast Wet Prep None none    Motile Trichomonas Wet Prep Negative Negative    Clue Cells Wet Prep None NONE    WBC WET PREP <2 2 - 5    Bacteria Wet Prep Few None    pH Wet Prep 4.5 3.8 - 4.5    Odor Wet  Prep None NONE     Assessment and Plan     Debbie was seen today for iud.    Diagnoses and all orders for this visit:    IUD check up  IUD strings checked and in place.     Vaginal discharge  Most likely physiological and reassurance provided. Wet prep was normal. Patient declined STD testing.   -     Wet Prep ([facility])    Benign essential hypertension- controlled   Improved on repeat BP. Continue to monitor BP monthly and return to clinic if consistently elevated.     Need for influenza vaccination  -     ADMIN VACCINE, INITIAL  -     FLU VAC PRESRV FREE QUAD SPLIT VIR IM, 0.5 mL dosage    There are no discontinued medications.  Options for treatment and follow-up care were reviewed with the patient. Debbie Hood  engaged in the decision making process and verbalized understanding of the options discussed and agreed with the final plan.    Nneka Lama MD

## 2017-11-08 NOTE — PATIENT INSTRUCTIONS
Here is the plan from today's visit    1. IUD check up  IUD in place, please assess monthly     2. Vaginal discharge  - Wet Prep ([facility])    3. Benign essential hypertension- Controlled on repeat     Please call or return to clinic if your symptoms don't go away.    Follow up plan  Follow up as needed    Thank you for coming to [facility] today.  Lab Testing:  **If you had lab testing today and your results are reassuring or normal they will be mailed to you or sent through [employer] within 7 days.   **If the lab tests need quick action we will call you with the results.  The phone number we will call with results is # [phone] (home) [phone] (work). If this is not the best number please call our clinic and change the number.  Medication Refills:  If you need any refills please call your pharmacy and they will contact us.   If you need to  your refill at a new pharmacy, please contact the new pharmacy directly. The new pharmacy will help you get your medications transferred faster.   Scheduling:  If you have any concerns about today's visit or wish to schedule another appointment please call our office during normal business hours [phone] (8-5:00 M-F)  If a referral was made to a [facility] and you don't get a call from central scheduling please call [phone].  If a Mammogram was ordered for you at [facility] call [phone] to schedule or change your appointment.  If you had an XRay/CT/Ultrasound/MRI ordered the number is [phone] to schedule or change your radiology appointment.   Medical Concerns:  If you have urgent medical concerns please call [phone] at any time of the day.

## 2018-09-12 ENCOUNTER — OFFICE VISIT (OUTPATIENT)
Dept: FAMILY MEDICINE | Facility: CLINIC | Age: 43
End: 2018-09-12
Payer: COMMERCIAL

## 2018-09-12 VITALS
RESPIRATION RATE: 16 BRPM | BODY MASS INDEX: 28.49 KG/M2 | DIASTOLIC BLOOD PRESSURE: 88 MMHG | WEIGHT: 171.2 LBS | OXYGEN SATURATION: 98 % | SYSTOLIC BLOOD PRESSURE: 128 MMHG | TEMPERATURE: 98.6 F | HEART RATE: 75 BPM

## 2018-09-12 DIAGNOSIS — L02.211 ABSCESS OF ABDOMINAL WALL: Primary | ICD-10-CM

## 2018-09-12 RX ORDER — CEPHALEXIN 500 MG/1
500 CAPSULE ORAL 2 TIMES DAILY
Qty: 20 CAPSULE | Refills: 0 | Status: SHIPPED | OUTPATIENT
Start: 2018-09-12 | End: 2019-10-31

## 2018-09-12 NOTE — PROGRESS NOTES
Preceptor Attestation:  I was present with the medical student who participated in the service and in the documentation of this note. I have verified the history and personally performed the physical exam and medical decision making. I have verified the content of the note, which accurately reflects my assessment of the patient and the plan of care. Visit length 15 min, >50% spent counseling re sxs and plan.   Supervising Physician:  Enedina Downing MD

## 2018-09-12 NOTE — MR AVS SNAPSHOT
After Visit Summary   9/12/2018    Debbie Hood    MRN: [id number]           Patient Information     Date Of Birth          1975        Visit Information        Provider Department      9/12/2018 1:00 PM Enedina Downing MD [facility]        Today's Diagnoses     Abscess of abdominal wall    -  1       Follow-ups after your visit        Who to contact     Please call your clinic at [phone] to:    Ask questions about your health    Make or cancel appointments    Discuss your medicines    Learn about your test results    Speak to your doctor            Additional Information About Your Visit        MyChart Information     [employer] gives you secure access to your electronic health record. If you see a primary care provider, you can also send messages to your care team and make appointments. If you have questions, please call your primary care clinic.  If you do not have a primary care provider, please call [phone] and they will assist you.      [employer] is an electronic gateway that provides easy, online access to your medical records. With [employer], you can request a clinic appointment, read your test results, renew a prescription or communicate with your care team.     To access your existing account, please contact your [facility] or call [phone] for assistance.        Care EveryWhere ID     This is your Care EveryWhere ID. This could be used by other organizations to access your [facility] medical records  [id number]        Your Vitals Were     Pulse Temperature Respirations Last Period Pulse Oximetry BMI (Body Mass Index)    75 98.6  F (37  C) (Oral) 16 09/09/2018 (Exact Date) 98% 28.49 kg/m2       Blood Pressure from Last 3 Encounters:   09/12/18 128/88   11/08/17 126/87   05/09/17 127/86    Weight from Last 3 Encounters:   09/12/18 171 lb 3.2 oz (77.7 kg)   11/08/17 179 lb 9.6 oz (81.5 kg)   05/09/17 177 lb 12.8 oz  (80.6 kg)              Today, you had the following     No orders found for display         Today's Medication Changes          These changes are accurate as of 9/12/18  1:30 PM.  If you have any questions, ask your nurse or doctor.               Start taking these medicines.        Dose/Directions    cephALEXin 500 MG capsule   Commonly known as:  KEFLEX   Used for:  Abscess of abdominal wall   Started by:  Enedina Downing MD        Dose:  500 mg   Take 1 capsule (500 mg) by mouth 2 times daily   Quantity:  20 capsule   Refills:  0            Where to get your medicines      These medications were sent to [facility] [place], [place] - [address] , [facility] [address]     Phone:  [phone]     cephALEXin 500 MG capsule                Primary Care Provider Office Phone # Fax #    Nneka Hylton [phone]       [address] [place] [address]        Equal Access to Services     [facility] : Hadii antoine greene hadasho Somarvin, waaxda luqadaha, qaybta kaalmada adeegyada, james edmondson . So [facility] [phone].    ATENCIÓN: Si habla español, tiene a townsend disposición servicios gratuitos de asistencia lingüística. Alicja al [phone].    We comply with applicable federal civil rights laws and [place] laws. We do not discriminate on the basis of race, color, national origin, age, disability, sex, sexual orientation, or gender identity.            Thank you!     Thank you for choosing [facility]  for your care. Our goal is always to provide you with excellent care. Hearing back from our patients is one way we can continue to improve our services. Please take a few minutes to complete the written survey that you may receive in the mail after your visit with us. Thank you!             Your Updated Medication List - Protect others around you: Learn how to safely use, store and throw away your  medicines at www.disposemymeds.org.          This list is accurate as of 9/12/18  1:30 PM.  Always use your most recent med list.                   Brand Name Dispense Instructions for use Diagnosis    cephALEXin 500 MG capsule    KEFLEX    20 capsule    Take 1 capsule (500 mg) by mouth 2 times daily    Abscess of abdominal wall       ibuprofen 600 MG tablet    ADVIL/MOTRIN    60 tablet    Take 1 tablet (600 mg) by mouth every 6 hours as needed for moderate pain    Vaginal delivery       levonorgestrel 20 MCG/24HR IUD    MIRENA     1 each (20 mcg) by Intrauterine route continuous    Encounter for insertion of intrauterine contraceptive device       order for DME     1 Units    Equipment being ordered: BP cuff. Measure daily and if 160/110 go to hospital.    Elevated blood pressure affecting pregnancy in third trimester, antepartum       triamcinolone 55 MCG/ACT inhaler    NASACORT     Spray 2 sprays into both nostrils daily

## 2019-01-20 NOTE — PROGRESS NOTES
Discussed the following results during the clinic visit. See progress note for details.     Alice Luna MD
[facility]  IUD Insertion Note    Debbie Hood is a patient of Nneka Pandey here for an IUD insertion   Indication: contraception    Counseling: Discussed potential side effects of Paragard, including increased bleeding and cramping, as well as the Mirena, including unpredictable spotting and amenorrhea.  Patient aware to check for strings every month. Patient chose Mirena placement today.     Consent: Affirmation of informed consent was signed and scanned into the medical record. Risks, benefits and alternatives were discussed including risk of infection, bleeding and small risk of uterine perforation.  Patient's questions were elicited and answered.   Procedure safety checklist was completed:  Yes  Time Out (Pause for the Cause) completed: Yes    Labs: UPT negative    Technique:   Patient was premedicated with ibuprofen:   Yes  Uterine position was confirmed by bimanual exam: Yes   Using a sterile speculum and equipment, the cervix was examined.     The cervix was cleansed with Betadine prep. A tenaculum was applied to the cervix with tension to straighten the cervical canal.  The uterus was sounded to 7cm.  A Mirena IUD was inserted in the usual fashion and strings trimmed 2cm below the cervix.  EBL: minimal  Complications: No  Tolerance: Pt tolerated procedure well and was in stable condition.  She was observed in the clinic for 10 min, no acute issues.     Follow up: Pt was instructed to call if fever, chills, heavy bleeding, severe cramping or foul smelling discharge. May take 600 mg ibuprofen QID prn for mild cramping.  She was advised to check the IUD strings monthly.  Recommended that she return in 1 month for IUD string check.    Resident: Florencia Eagle  Faculty: Alice Luna MD present for and supervised this entire procedure.    
Preceptor Attestation:   Patient seen and discussed with the resident. Present for the entire procedure. Assessment and plan reviewed with resident and agreed upon.   Supervising Physician:  [facility]    
no weakness/no tingling/no abrasion/no back pain/no fever/no numbness

## 2019-10-31 ENCOUNTER — OFFICE VISIT (OUTPATIENT)
Dept: FAMILY MEDICINE | Facility: CLINIC | Age: 44
End: 2019-10-31
Payer: COMMERCIAL

## 2019-10-31 VITALS
RESPIRATION RATE: 18 BRPM | TEMPERATURE: 98.3 F | BODY MASS INDEX: 28.89 KG/M2 | HEIGHT: 65 IN | OXYGEN SATURATION: 97 % | HEART RATE: 93 BPM | DIASTOLIC BLOOD PRESSURE: 90 MMHG | SYSTOLIC BLOOD PRESSURE: 132 MMHG | WEIGHT: 173.4 LBS

## 2019-10-31 DIAGNOSIS — Z00.00 ROUTINE GENERAL MEDICAL EXAMINATION AT A HEALTH CARE FACILITY: Primary | ICD-10-CM

## 2019-10-31 DIAGNOSIS — R03.0 ELEVATED BLOOD PRESSURE READING WITHOUT DIAGNOSIS OF HYPERTENSION: ICD-10-CM

## 2019-10-31 DIAGNOSIS — L98.9 SKIN LESION: ICD-10-CM

## 2019-10-31 DIAGNOSIS — L72.3 SEBACEOUS CYST: ICD-10-CM

## 2019-10-31 ASSESSMENT — PAIN SCALES - GENERAL: PAINLEVEL: NO PAIN (0)

## 2019-10-31 ASSESSMENT — MIFFLIN-ST. JEOR: SCORE: 1435.54

## 2019-10-31 NOTE — PROGRESS NOTES
"  Female Physical Note          HPI         Concerns today:   1) BP check  - trending up (-150)  - no vision changes  - not currently on meds  - brought records of measurements from home  2) chest tightness recently, then cold symptoms (likely related)  - no further tightness, did develop URI symptoms and cough    L eye lesion - no change, but \"always there\"  - red, flat, some scale  After hemorrhoids - seems to have \"extra skin\", not itching, not bleeding, just \"there\"  L ear - itchy, no discharge, no obvious rash, normal hearing  Sebaceous cyst under L breast  - same as one under R breast previously  - had been getting tender, large; then seemed to drain and now feels better      Patient Active Problem List   Diagnosis     Abnormal Pap smear and cervical HPV (human papillomavirus)     Benign essential hypertension     Seasonal allergic rhinitis     Elevated blood pressure affecting pregnancy in third trimester, antepartum     IUD (intrauterine device) in place       Past Medical History:   Diagnosis Date     Hypertension     borderline high BP few years back while on OCP, now normal.     Hypertension in pregnancy, pre-existing 2/26/2013    Mild diastolic HTN - documented prior to pregnancy.  Baseline labs checked and normal.  Baseline 24-hr urine protein = 0.07g/24hr.      Mastitis, acute 10/2013     Preeclampsia complicating hypertension 10/2013    IOL at term       Family History   Problem Relation Age of Onset     Hypertension Father      Breast Cancer Paternal Grandmother      Thyroid Disease Maternal Grandmother      Depression Sister            Review of Systems:     Review of Systems:  CONSTITUTIONAL: NEGATIVE for fever, chills, change in weight  INTEGUMENTARY/SKIN: NEGATIVE for worrisome rashes, moles - has a cyst under L breast  EYES: NEGATIVE for vision changes or irritation - see HPI re: L eye lesion  ENT/MOUTH: NEGATIVE for mouth and throat problems - mild L ear irritation, itching  RESP: " NEGATIVE for significant cough or SOB  BREAST: NEGATIVE for masses, tenderness or discharge  CV: NEGATIVE for chest pain, palpitations or peripheral edema  GI: NEGATIVE for nausea, abdominal pain, heartburn, or change in bowel habits  : NEGATIVE for frequency, dysuria, or hematuria  MUSCULOSKELETAL: NEGATIVE for significant arthralgias or myalgia  NEURO: NEGATIVE for weakness, dizziness or paresthesias  ENDOCRINE: NEGATIVE for temperature intolerance, skin/hair changes  HEME/ALLERGY: NEGATIVE for bleeding problems  PSYCHIATRIC: NEGATIVE for changes in mood or affect  Sleep:   Do you snore most or the night (as reported by a family member)? No  Do you feel sleepy or extremely tired during most of the day? No           Social History     Social History     Socioeconomic History     Marital status:      Spouse name: Khai     Number of children: 1     Years of education: 18     Highest education level: Not on file   Occupational History     Occupation:      Employer: [employer]   Social Needs     Financial resource strain: Not on file     Food insecurity:     Worry: Not on file     Inability: Not on file     Transportation needs:     Medical: Not on file     Non-medical: Not on file   Tobacco Use     Smoking status: Former Smoker     Packs/day: 0.00     Types: Cigarettes     Last attempt to quit: 2008     Years since quittin.2     Smokeless tobacco: Never Used     Tobacco comment: 5 cigs a day   Substance and Sexual Activity     Alcohol use: Yes     Alcohol/week: 0.0 standard drinks     Comment: 2 drinks a week     Drug use: No     Sexual activity: Yes     Partners: Male     Birth control/protection: I.U.D.   Lifestyle     Physical activity:     Days per week: Not on file     Minutes per session: Not on file     Stress: Not on file   Relationships     Social connections:     Talks on phone: Not on file     Gets together: Not on file     Attends [employer]ism service: Not on file     Active  "member of club or organization: Not on file     Attends meetings of clubs or organizations: Not on file     Relationship status: Not on file     Intimate partner violence:     Fear of current or ex partner: Not on file     Emotionally abused: Not on file     Physically abused: Not on file     Forced sexual activity: Not on file   Other Topics Concern     Parent/sibling w/ CABG, MI or angioplasty before 65F 55M? 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 Self, , 2 kids    Has anyone hurt you physically, for example by pushing, hitting, slapping or kicking you or forcing you to have sex? Denies  Do you feel threatened or controlled by a partner, ex-partner or anyone in your life? Denies    Sexual Health     Sexual concerns: No   STI History: Neg  Pregnancy History:   LMP Patient's last menstrual period was 10/12/2019.   Last Pap Smear Date:   Lab Results   Component Value Date    PAP NIL 2017    PAP NIL 2013     Abnormal Pap History: see problem list    Recommended Screening     UTD with current recommended screening  - pap due in          Physical Exam:     Vitals: BP (!) 132/90   Pulse 93   Temp 98.3  F (36.8  C) (Oral)   Resp 18   Ht 1.64 m (5' 4.57\")   Wt 78.7 kg (173 lb 6.4 oz)   LMP 10/12/2019   SpO2 97%   Breastfeeding? No   BMI 29.24 kg/m    BMI= Body mass index is 29.24 kg/m .   GENERAL: healthy, alert and no distress  EYES: Eyes grossly normal to inspection, extraocular movements - intact, and PERRL  - small (3mm) red, flat, round lesion below the lateral corner of the L eye, slight scale, rough to touch, ??actinic keratosis vs eczematous lesion  HENT: ear canals- normal; TMs- normal; Nose- normal; Mouth- no ulcers, no lesions  NECK: no tenderness, no adenopathy, no asymmetry, no masses, no " stiffness; thyroid- normal to palpation  RESP: lungs clear to auscultation - no rales, no rhonchi, no wheezes  BREAST: no masses, no tenderness, no nipple discharge, no palpable axillary masses or adenopathy  - 2cm small cyst below the L breast with some drainage, no surrounding infection, not tender to the touch currently  CV: regular rates and rhythm, normal S1 S2, no S3 or S4 and no murmur, no click or rub -  ABDOMEN: soft, no tenderness, no  hepatosplenomegaly, no masses, normal bowel sounds  MS: extremities- no gross deformities noted, no edema  SKIN: no suspicious lesions, no rashes  NEURO: strength and tone- normal, sensory exam- grossly normal, mentation- intact, speech- normal, reflexes- symmetric  BACK: no CVA tenderness, no paralumbar tenderness  - female: deferred  PSYCH: Alert and oriented times 3; speech- coherent , normal rate and volume; able to articulate logical thoughts, able to abstract reason, no tangential thoughts, no hallucinations or delusions, affect- normal  LYMPHATICS: ant. cervical- normal, post. cervical- normal, axillary- normal, supraclavicular- normal      Assessment and Plan      Debbie was seen today for physical.    Diagnoses and all orders for this visit:    Routine general medical examination at a health care facility    Elevated blood pressure reading without diagnosis of hypertension  -     Clinical Pharmacy-[facility] INTERNAL REFERRAL  -     Lipid Panel ([facility]); Future  -     Hemoglobin A1c ([facility]); Future  -     Basic Metabolic Panel ([facility]); Future  -     CBC with platelets; Future  -     Albumin Random Urine Quantitative with Creat Ratio; Future  -     TSH with free T4 reflex; Future    Skin lesion  Comments:  L eye  Orders:  -     DERMATOLOGY REFERRAL - INTERNAL    Sebaceous cyst  Comments:  Under both breasts      1) UTD with screening, did receive the flu shot this year  2) Return for fasting labs and then follow up in a few weeks with BP monitor to calibrate  the home machine, check in with the PharmD team with measurements (todays are scanned) and decide if should resume BP meds.  Previously on lisinopril.  3) Healthy diet and exercise discussed (weight has been stable)  4) referral to derm re: L eye lesion  5) Reassurance re: sebaceous cyst, seems to have resolved.  If becomes regularly bothersome, recommend referral for removal of whole cyst  6) Reassurance re: post-hemorrhoid issues (no symptoms, wouldn't do anything right now)    Options for treatment and follow-up care were reviewed with the patient . Debbie Hood and/or guardian engaged in the decision making process and verbalized understanding of the options discussed and agreed with the final plan.    Nneka Hylton MD

## 2019-11-01 ENCOUNTER — TELEPHONE (OUTPATIENT)
Dept: FAMILY MEDICINE | Facility: CLINIC | Age: 44
End: 2019-11-01

## 2019-11-01 NOTE — TELEPHONE ENCOUNTER
Attempted to reach patient to schedule PharmD appointment. LVM with call back.    Anahi Medina, Patient Representative

## 2019-11-05 NOTE — TELEPHONE ENCOUNTER
Called patient to schedule Pharm D appointment. Per patient, believes that she is supposed to have labs prior to Pharm D visit, but hasn't been feeling well. Patient will call back to schedule lab and Pharm D appointments once she is feeling better.      Reason: blood pressure cuff calibration and discuss potential BP meds  Urgency of Appointment: 1 month  Is this for a one time consult or how many independent pharmacy visits should the patient have before having a follow up provider appointment? One Time Consult

## 2019-12-04 ENCOUNTER — OFFICE VISIT (OUTPATIENT)
Dept: DERMATOLOGY | Facility: CLINIC | Age: 44
End: 2019-12-04
Attending: FAMILY MEDICINE
Payer: COMMERCIAL

## 2019-12-04 DIAGNOSIS — D48.9 NEOPLASM OF UNCERTAIN BEHAVIOR: Primary | ICD-10-CM

## 2019-12-04 ASSESSMENT — PAIN SCALES - GENERAL
PAINLEVEL: NO PAIN (0)
PAINLEVEL: NO PAIN (0)

## 2019-12-04 NOTE — LETTER
12/4/2019       RE: Debbie Hood  [address]  [place] [address]     Dear Colleague,    Thank you for referring your patient, Debbie Hood, to the [facility] DERMATOLOGY at [facility]. Please see a copy of my visit note below.    [facility] Dermatology Note      Dermatology Problem List:  1. NUB, left lower eyelid, s/p bx 12/4/19.    Encounter Date: Dec 4, 2019    CC:  Chief Complaint   Patient presents with     Derm Problem     Spot check, lesion on left side of face. Debbie states she has had this spot for about four years.          History of Present Illness:  Ms. Debbie Hood is a 44 year old female who presents today for a spot check. This is her first visit in our dermatology clinic. She reports a lesion under her left eye. First appeared about 4 years ago. The lesion is asymptomatic. It is not painful, bleeding, or growing. She does not have personal history of skin cancer, however her father had an unknown type. The patient is otherwise feeling well. There are no other skin concerns at this time.      Past Medical History:   Patient Active Problem List   Diagnosis     Abnormal Pap smear and cervical HPV (human papillomavirus)     Benign essential hypertension     Seasonal allergic rhinitis     Elevated blood pressure affecting pregnancy in third trimester, antepartum     IUD (intrauterine device) in place     Sebaceous cyst     Past Medical History:   Diagnosis Date     Hypertension     borderline high BP few years back while on OCP, now normal.     Hypertension in pregnancy, pre-existing 2/26/2013    Mild diastolic HTN - documented prior to pregnancy.  Baseline labs checked and normal.  Baseline 24-hr urine protein = 0.07g/24hr.      Mastitis, acute 10/2013     Preeclampsia complicating hypertension 10/2013    IOL at term     Past Surgical History:   Procedure Laterality Date     HC CLOSED TX ELBOW DISLOCATION W/O  ANESTHESIA  1990     TONSILLECTOMY & ADENOIDECTOMY  1993       Social History:  Patient reports that she quit smoking about 11 years ago. Her smoking use included cigarettes. She smoked 0.00 packs per day. She has never used smokeless tobacco. She reports current alcohol use. She reports that she does not use drugs.    Family History:  Family History   Problem Relation Age of Onset     Hypertension Father      Breast Cancer Paternal Grandmother      Thyroid Disease Maternal Grandmother      Depression Sister      Melanoma No family hx of        Medications:  Current Outpatient Medications   Medication Sig Dispense Refill     levonorgestrel (MIRENA) 20 MCG/24HR IUD 1 each (20 mcg) by Intrauterine route continuous       order for DME Equipment being ordered: BP cuff. Measure daily and if 160/110 go to hospital. 1 Units 0     triamcinolone (NASACORT) 55 MCG/ACT Inhaler Spray 2 sprays into both nostrils daily         Allergies   Allergen Reactions     Kiwi        Review of Systems:  -Skin Establ Pt: The patient denies any new rash, pruritus, or lesions that are symptomatic, changing or bleeding, except as per HPI.  -Constitutional: The patient is feeling generally well.    Physical exam:  GEN: This is a well developed, well-nourished female in no acute distress, in a pleasant mood.   SKIN: Focused examination of the left lower eyelid was performed.  - 5 x 6 mm pink scaly macule on the left lower eyelid. Dermatoscopy of lateral portion with arborizing vessels.   - No other lesions of concern on areas examined.         Impression/Plan:  1. 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 DDx includes BCC vs AK.   - After discussion of benefits and risks including but not limited to bleeding, infection, scar, incomplete removal, recurrence, and non-diagnostic biopsy, written consent and photographs were obtained. The area was cleaned with isopropyl alcohol. 0.75 mL of 1% lidocaine with epinephrine was injected to obtain  adequate anesthesia of the lesion on the left lower eyelid. A shave biopsy was performed. Hemostasis was achieved with aluminium chloride. Vaseline and a sterile dressing were applied. The patient tolerated the procedure and no complications were noted. The patient was provided with verbal and written post care instructions.     Follow-up pending results, earlier for new or changing lesions.  Plan for FBSE at next visit.      Staff Involved:    Scribe Disclosure  I, Rey Fu, am serving as a scribe to document services personally performed by Dr. Tyra Stratton, based on data collection and the provider's statements to me.     Provider Disclosure:   The documentation recorded by the scribe accurately reflects the services I personally performed and the decisions made by me.    Tyra Stratton MD    Department of Dermatology  [facility]: Phone: [phone], Fax: [phone]

## 2019-12-04 NOTE — PROGRESS NOTES
[facility] Dermatology Note      Dermatology Problem List:  1. NUB, left lower eyelid, s/p bx 12/4/19.    Encounter Date: Dec 4, 2019    CC:  Chief Complaint   Patient presents with     Derm Problem     Spot check, lesion on left side of face. Debbie states she has had this spot for about four years.          History of Present Illness:  Ms. Debbie Hood is a 44 year old female who presents today for a spot check. This is her first visit in our dermatology clinic. She reports a lesion under her left eye. First appeared about 4 years ago. The lesion is asymptomatic. It is not painful, bleeding, or growing. She does not have personal history of skin cancer, however her father had an unknown type. The patient is otherwise feeling well. There are no other skin concerns at this time.      Past Medical History:   Patient Active Problem List   Diagnosis     Abnormal Pap smear and cervical HPV (human papillomavirus)     Benign essential hypertension     Seasonal allergic rhinitis     Elevated blood pressure affecting pregnancy in third trimester, antepartum     IUD (intrauterine device) in place     Sebaceous cyst     Past Medical History:   Diagnosis Date     Hypertension     borderline high BP few years back while on OCP, now normal.     Hypertension in pregnancy, pre-existing 2/26/2013    Mild diastolic HTN - documented prior to pregnancy.  Baseline labs checked and normal.  Baseline 24-hr urine protein = 0.07g/24hr.      Mastitis, acute 10/2013     Preeclampsia complicating hypertension 10/2013    IOL at term     Past Surgical History:   Procedure Laterality Date     HC CLOSED TX ELBOW DISLOCATION W/O ANESTHESIA  1990     TONSILLECTOMY & ADENOIDECTOMY  1993       Social History:  Patient reports that she quit smoking about 11 years ago. Her smoking use included cigarettes. She smoked 0.00 packs per day. She has never used smokeless tobacco. She reports current alcohol use. She reports that she  does not use drugs.    Family History:  Family History   Problem Relation Age of Onset     Hypertension Father      Breast Cancer Paternal Grandmother      Thyroid Disease Maternal Grandmother      Depression Sister      Melanoma No family hx of        Medications:  Current Outpatient Medications   Medication Sig Dispense Refill     levonorgestrel (MIRENA) 20 MCG/24HR IUD 1 each (20 mcg) by Intrauterine route continuous       order for DME Equipment being ordered: BP cuff. Measure daily and if 160/110 go to hospital. 1 Units 0     triamcinolone (NASACORT) 55 MCG/ACT Inhaler Spray 2 sprays into both nostrils daily         Allergies   Allergen Reactions     Kiwi        Review of Systems:  -Skin Establ Pt: The patient denies any new rash, pruritus, or lesions that are symptomatic, changing or bleeding, except as per HPI.  -Constitutional: The patient is feeling generally well.    Physical exam:  GEN: This is a well developed, well-nourished female in no acute distress, in a pleasant mood.   SKIN: Focused examination of the left lower eyelid was performed.  - 5 x 6 mm pink scaly macule on the left lower eyelid. Dermatoscopy of lateral portion with arborizing vessels.   - No other lesions of concern on areas examined.         Impression/Plan:  1. 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 DDx includes BCC vs AK.   - After discussion of benefits and risks including but not limited to bleeding, infection, scar, incomplete removal, recurrence, and non-diagnostic biopsy, written consent and photographs were obtained. The area was cleaned with isopropyl alcohol. 0.75 mL of 1% lidocaine with epinephrine was injected to obtain adequate anesthesia of the lesion on the left lower eyelid. A shave biopsy was performed. Hemostasis was achieved with aluminium chloride. Vaseline and a sterile dressing were applied. The patient tolerated the procedure and no complications were noted. The patient was provided with verbal and  written post care instructions.     Follow-up pending results, earlier for new or changing lesions.  Plan for FBSE at next visit.      Staff Involved:    Scribe Disclosure  I, Rey Fu, am serving as a scribe to document services personally performed by Dr. Tyra Stratton, based on data collection and the provider's statements to me.     Provider Disclosure:   The documentation recorded by the scribe accurately reflects the services I personally performed and the decisions made by me.    Tyra Stratton MD    Department of Dermatology  [facility]: Phone: [phone], Fax: [phone]

## 2019-12-04 NOTE — NURSING NOTE
Dermatology Rooming Note    Debbie Hood's goals for this visit include:   Chief Complaint   Patient presents with     Derm Problem     Spot check, lesion on left side of face. Debbie states she has had this spot for about four years.      Merari Arora LPN

## 2019-12-04 NOTE — NURSING NOTE
Lidocaine-epinephrine 1-1:926309 % injection   0.3mL once for one use, starting 12/4/2019 ending 12/4/2019,  2mL disp, R-0, injection  Injected by Loreta Ba CMA

## 2019-12-04 NOTE — PATIENT INSTRUCTIONS
We will check spot on eye today.  Could be low risk skin cancer (basal cell) or low risk pre-cancer (actinic keratosis).    Return pending biopsy results.        Wound Care After a Biopsy    What is a skin biopsy?  A skin biopsy allows the doctor to examine a very small piece of tissue under the microscope to determine the diagnosis and the best treatment for the skin condition. A local anesthetic (numbing medicine)  is injected with a very small needle into the skin area to be tested. A small piece of skin is taken from the area. Sometimes a suture (stitch) is used.     What are the risks of a skin biopsy?  I will experience scar, bleeding, swelling, pain, crusting and redness. I may experience incomplete removal or recurrence. Risks of this procedure are excessive bleeding, bruising, infection, nerve damage, numbness, thick (hypertrophic or keloidal) scar and non-diagnostic biopsy.    How should I care for my wound for the first 24 hours?    Keep the wound dry and covered for 24 hours    If it bleeds, hold direct pressure on the area for 15 minutes. If bleeding does not stop then go to the emergency room    Avoid strenuous exercise the first 1-2 days or as your doctor instructs you    How should I care for the wound after 24 hours?    After 24 hours, remove the bandage    You may bathe or shower as normal    If you had a scalp biopsy, you can shampoo as usual and can use shower water to clean the biopsy site daily    Clean the wound twice a day with gentle soap and water    Do not scrub, be gentle    Apply white petroleum/Vaseline after cleaning the wound with a cotton swab or a clean finger, and keep the site covered with a Bandaid /bandage. Bandages are not necessary with a scalp biopsy    If you are unable to cover the site with a Bandaid /bandage, re-apply ointment 2-3 times a day to keep the site moist. Moisture will help with healing    Avoid strenuous activity for first 1-2 days    Avoid lakes, rivers,  pools, and oceans until the stitches are removed or the site is healed    How do I clean my wound?    Wash hands thoroughly with soap or use hand  before all wound care    Clean the wound with gentle soap and water    Apply white petroleum/Vaseline  to wound after it is clean    Replace the Bandaid /bandage to keep the wound covered for the first few days or as instructed by your doctor    If you had a scalp biopsy, warm shower water to the area on a daily basis should suffice    What should I use to clean my wound?     Cotton-tipped applicators (Qtips )    White petroleum jelly (Vaseline ). Use a clean new container and use Q-tips to apply.    Bandaids   as needed    Gentle soap     How should I care for my wound long term?    Do not get your wound dirty    Keep up with wound care for one week or until the area is healed.    A small scab will form and fall off by itself when the area is completely healed. The area will be red and will become pink in color as it heals. Sun protection is very important for how your scar will turn out. Sunscreen with an SPF 30 or greater is recommended once the area is healed.    If you have stitches, stitches need to be removed in 14 days. You may return to our clinic for this or you may have it done locally at your doctor s office.    You should have some soreness but it should be mild and slowly go away over several days. Talk to your doctor about using tylenol for pain,    When should I call my doctor?  If you have increased:     Pain or swelling    Pus or drainage (clear or slightly yellow drainage is ok)    Temperature over 100F    Spreading redness or warmth around wound    When will I hear about my results?  The biopsy results can take 2-3 weeks to come back. The clinic will call you with the results, send you a [employer] message, or have you schedule a follow-up clinic or phone time to discuss the results. Contact our clinics if you do not hear from us in 3 weeks.      Who should I call with questions?    [facility]: [phone]     [facility]: [phone]    For urgent needs outside of business hours call the [facility] at [phone] and ask for the dermatology resident on call

## 2019-12-09 LAB — COPATH REPORT: NORMAL

## 2019-12-10 ENCOUNTER — TELEPHONE (OUTPATIENT)
Dept: DERMATOLOGY | Facility: CLINIC | Age: 44
End: 2019-12-10

## 2019-12-10 DIAGNOSIS — C44.310 BCC (BASAL CELL CARCINOMA), FACE: Primary | ICD-10-CM

## 2019-12-10 NOTE — TELEPHONE ENCOUNTER
----- Message from Tyra Stratton MD sent at 12/9/2019  1:41 PM CST -----  Pls refer patient to surgery for Mohs and call to schedule.

## 2019-12-10 NOTE — TELEPHONE ENCOUNTER
I called and spoke with patient and gave her the biopsy results. Patient verbalized agreement and understanding. I place the derm surgery referral and scheduled the appointment.     Ronit Brooks CMA

## 2019-12-11 NOTE — TELEPHONE ENCOUNTER
FUTURE VISIT INFORMATION      FUTURE VISIT INFORMATION:    Date: 12.16.19    Time: 2:45    Location:  DermSurg  REFERRAL INFORMATION:    Referring provider:  Dr. Stratton    Referring providers clinic:  [facility]    Reason for visit/diagnosis  BCC left lower eyelid    RECORDS REQUESTED FROM:       Clinic name Comments Records Status Photos Status   [facility] 12.4.19 Dr. Stratton       Path # [id number] Epic Epic

## 2019-12-13 DIAGNOSIS — R03.0 ELEVATED BLOOD PRESSURE READING WITHOUT DIAGNOSIS OF HYPERTENSION: ICD-10-CM

## 2019-12-13 LAB
BUN SERPL-MCNC: 11.6 MG/DL (ref 7–19)
CALCIUM SERPL-MCNC: 9.5 MG/DL (ref 8.5–10.1)
CHLORIDE SERPLBLD-SCNC: 100.6 MMOL/L (ref 98–110)
CHOLEST SERPL-MCNC: 151.2 MG/DL (ref 0–200)
CHOLEST/HDLC SERPL: 3.1 {RATIO} (ref 0–5)
CO2 SERPL-SCNC: 26.8 MMOL/L (ref 20–32)
CREAT SERPL-MCNC: 0.6 MG/DL (ref 0.5–1)
CREAT UR-MCNC: 288 MG/DL
ERYTHROCYTE [DISTWIDTH] IN BLOOD BY AUTOMATED COUNT: 13 % (ref 10–15)
GFR SERPL CREATININE-BSD FRML MDRD: >90 ML/MIN/1.7 M2
GLUCOSE SERPL-MCNC: 94.9 MG'DL (ref 70–99)
HBA1C MFR BLD: 5.1 % (ref 4.1–5.7)
HCT VFR BLD AUTO: 44.8 % (ref 35–47)
HDLC SERPL-MCNC: 48.6 MG/DL
HGB BLD-MCNC: 14.2 G/DL (ref 11.7–15.7)
LDLC SERPL CALC-MCNC: 72 MG/DL (ref 0–129)
MCH RBC QN AUTO: 28 PG (ref 26.5–33)
MCHC RBC AUTO-ENTMCNC: 31.7 G/DL (ref 31.5–36.5)
MCV RBC AUTO: 88 FL (ref 78–100)
MICROALBUMIN UR-MCNC: 20 MG/L
MICROALBUMIN/CREAT UR: 7.01 MG/G CR (ref 0–25)
PLATELET # BLD AUTO: 262 10E9/L (ref 150–450)
POTASSIUM SERPL-SCNC: 3.7 MMOL/DL (ref 3.3–4.5)
RBC # BLD AUTO: 5.07 10E12/L (ref 3.8–5.2)
SODIUM SERPL-SCNC: 135.6 MMOL/L (ref 132.6–141.4)
TRIGL SERPL-MCNC: 152.8 MG/DL (ref 0–150)
TSH SERPL DL<=0.005 MIU/L-ACNC: 3.25 MU/L (ref 0.4–4)
VLDL CHOLESTEROL: 30.6 MG/DL (ref 7–32)
WBC # BLD AUTO: 7.8 10E9/L (ref 4–11)

## 2019-12-16 ENCOUNTER — OFFICE VISIT (OUTPATIENT)
Dept: DERMATOLOGY | Facility: CLINIC | Age: 44
End: 2019-12-16
Payer: COMMERCIAL

## 2019-12-16 ENCOUNTER — PRE VISIT (OUTPATIENT)
Dept: DERMATOLOGY | Facility: CLINIC | Age: 44
End: 2019-12-16

## 2019-12-16 DIAGNOSIS — C44.1192 BASAL CELL CARCINOMA (BCC) OF LEFT LOWER EYELID: Primary | ICD-10-CM

## 2019-12-16 ASSESSMENT — PAIN SCALES - GENERAL: PAINLEVEL: NO PAIN (0)

## 2019-12-16 NOTE — LETTER
12/16/2019       RE: Debbie Hood  [address]  [place] [address]     Dear Colleague,    Thank you for referring your patient, Debbie Hood, to the [facility] DERMATOLOGIC SURGERY at [facility]. Please see a copy of my visit note below.    [facility] Dermatology Note    Dermatology Surgery Clinic  [facility]  Clinics and Surgery Center  [address] [place] [id number]    Dermatology Problem List:  1.BCC, Left lower eyelid, s/p Bx 12/4/19 s/p [facility] TBD    Encounter Date: Dec 16, 2019    CC:  Chief Complaint   Patient presents with     Derm Problem     Debbie is here today for MOHS consult on her left lower eyelid due to BCC       History of Present Illness:  Ms. Debbie Hood is a 44 year old female who presents today for a Mohs consultation regarding a BCC of the L lower eyelid. The patient was referred by Dr. Stratton of  Derm. She saw Dr. Stratton on 12/4/19 for a spot check. At the time she had a lesion of concern under her left eye. Shave Bx was taken that indicated BCC. The lesion first appeared about 4 years ago and was slow growing. It is asymptomatic with no tenderness or bleeding. The patient is otherwise feeling well today. There are no other skin concerns at this time.      Past Medical History:   Patient Active Problem List   Diagnosis     Abnormal Pap smear and cervical HPV (human papillomavirus)     Benign essential hypertension     Seasonal allergic rhinitis     Elevated blood pressure affecting pregnancy in third trimester, antepartum     IUD (intrauterine device) in place     Sebaceous cyst     Past Medical History:   Diagnosis Date     Hypertension     borderline high BP few years back while on OCP, now normal.     Hypertension in pregnancy, pre-existing 2/26/2013    Mild diastolic HTN - documented prior to pregnancy.  Baseline labs checked and normal.  Baseline 24-hr urine protein =  0.07g/24hr.      Mastitis, acute 10/2013     Preeclampsia complicating hypertension 10/2013    IOL at term     Past Surgical History:   Procedure Laterality Date     HC CLOSED TX ELBOW DISLOCATION W/O ANESTHESIA       TONSILLECTOMY & ADENOIDECTOMY         Social History:  Social History     Socioeconomic History     Marital status:      Spouse name: Khai     Number of children: Christine     Years of education: 18     Highest education level: None   Occupational History     Occupation:      Employer: [employer]   Social Needs     Financial resource strain: None     Food insecurity:     Worry: None     Inability: None     Transportation needs:     Medical: None     Non-medical: None   Tobacco Use     Smoking status: Former Smoker     Packs/day: 0.00     Types: Cigarettes     Last attempt to quit: 2008     Years since quittin.3     Smokeless tobacco: Never Used     Tobacco comment: 5 cigs a day   Substance and Sexual Activity     Alcohol use: Yes     Alcohol/week: 0.0 standard drinks     Comment: 2 drinks a week     Drug use: No     Sexual activity: Yes     Partners: Male     Birth control/protection: I.U.D.   Lifestyle     Physical activity:     Days per week: None     Minutes per session: None     Stress: None   Relationships     Social connections:     Talks on phone: None     Gets together: None     Attends Uatsdin service: None     Active member of club or organization: None     Attends meetings of clubs or organizations: None     Relationship status: None     Intimate partner violence:     Fear of current or ex partner: None     Emotionally abused: None     Physically abused: None     Forced sexual activity: None   Other Topics Concern     Parent/sibling w/ CABG, MI or angioplasty before 65F 55M? 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 Measure daily and if 160/110 go to hospital. 1 Units 0     triamcinolone (NASACORT) 55 MCG/ACT Inhaler Spray 2 sprays into both nostrils daily         Allergies   Allergen Reactions     Kiwi        Review of Systems:  As per HPI.  -Skin Establ Pt: The patient denies any new rash, pruritus, or lesions that are symptomatic, changing or bleeding, except as per HPI.  -Constitutional: The patient is feeling generally well.    Physical exam:  Vitals: There were no vitals taken for this visit.  GEN: This is a well developed, well-nourished female in no acute distress, in a pleasant mood.  SKIN: Focused examination of the face/head, neck, and L eyelid region were performed.  - 8.0 mm scaly pink papule on the L lower eyelid  - No other lesions of concern on areas examined.       Impression/Plan:  1. Basal cell carcinoma, superficial and nodular types, extending to the deep margin, L lower eyelid, 8.0 mm     Anticipate flap repair for reconstruction.    Reviewed pathology report and nature of diagnosis.     Risks, benefits, and process of Mohs micrographic surgery were discussed including possibility of damage to surrounding anatomical structures and infection. Patient is comfortable proceeding with the surgery; consent form was obtained. She will be scheduled at her convenience.    No indication for pre-op antibiotics.    Pre-op written instructions provided.     Follow-up as scheduled for MMS.       Staff Involved:    Scribe Disclosure  I, Gely Alegre, am serving as a scribe to  document services personally performed by Dr. Tera Fatima, based on data collection and the provider's statements to me.     Attending Attestation  I attest that the Scribe recorded the interview and exam that I personally performed.  I have reviewed the note and edited it as necessary.    Tera Fatima M.D.  Professor  Director of Dermatologic Surgery  Department of Dermatology  [facility]

## 2019-12-16 NOTE — PROGRESS NOTES
[facility] Dermatology Note    Dermatology Surgery Clinic  [facility]  Clinics and Surgery Center  [address] [place] [id number]    Dermatology Problem List:  1.BCC, Left lower eyelid, s/p Bx 12/4/19 s/p [facility] TBD    Encounter Date: Dec 16, 2019    CC:  Chief Complaint   Patient presents with     Derm Problem     Debbie is here today for MOHS consult on her left lower eyelid due to BCC       History of Present Illness:  Ms. Debbie Hood is a 44 year old female who presents today for a Mohs consultation regarding a BCC of the L lower eyelid. The patient was referred by Dr. Stratton of  Derm. She saw Dr. Stratton on 12/4/19 for a spot check. At the time she had a lesion of concern under her left eye. Shave Bx was taken that indicated BCC. The lesion first appeared about 4 years ago and was slow growing. It is asymptomatic with no tenderness or bleeding. The patient is otherwise feeling well today. There are no other skin concerns at this time.      Past Medical History:   Patient Active Problem List   Diagnosis     Abnormal Pap smear and cervical HPV (human papillomavirus)     Benign essential hypertension     Seasonal allergic rhinitis     Elevated blood pressure affecting pregnancy in third trimester, antepartum     IUD (intrauterine device) in place     Sebaceous cyst     Past Medical History:   Diagnosis Date     Hypertension     borderline high BP few years back while on OCP, now normal.     Hypertension in pregnancy, pre-existing 2/26/2013    Mild diastolic HTN - documented prior to pregnancy.  Baseline labs checked and normal.  Baseline 24-hr urine protein = 0.07g/24hr.      Mastitis, acute 10/2013     Preeclampsia complicating hypertension 10/2013    IOL at term     Past Surgical History:   Procedure Laterality Date     HC CLOSED TX ELBOW DISLOCATION W/O ANESTHESIA  1990     TONSILLECTOMY & ADENOIDECTOMY  1993       Social History:  Social History      Socioeconomic History     Marital status:      Spouse name: Khai     Number of children: 1     Years of education: 18     Highest education level: None   Occupational History     Occupation:      Employer: [employer]   Social Needs     Financial resource strain: None     Food insecurity:     Worry: None     Inability: None     Transportation needs:     Medical: None     Non-medical: None   Tobacco Use     Smoking status: Former Smoker     Packs/day: 0.00     Types: Cigarettes     Last attempt to quit: 2008     Years since quittin.3     Smokeless tobacco: Never Used     Tobacco comment: 5 cigs a day   Substance and Sexual Activity     Alcohol use: Yes     Alcohol/week: 0.0 standard drinks     Comment: 2 drinks a week     Drug use: No     Sexual activity: Yes     Partners: Male     Birth control/protection: I.U.D.   Lifestyle     Physical activity:     Days per week: None     Minutes per session: None     Stress: None   Relationships     Social connections:     Talks on phone: None     Gets together: None     Attends Cheondoism service: None     Active member of club or organization: None     Attends meetings of clubs or organizations: None     Relationship status: None     Intimate partner violence:     Fear of current or ex partner: None     Emotionally abused: None     Physically abused: None     Forced sexual activity: None   Other Topics Concern     Parent/sibling w/ CABG, MI or angioplasty before 65F 55M? 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 Measure daily and if 160/110 go to hospital. 1 Units 0     triamcinolone (NASACORT) 55 MCG/ACT Inhaler Spray 2 sprays into both nostrils daily         Allergies   Allergen Reactions     Kiwi        Review of Systems:  As per HPI.  -Skin Establ Pt: The patient denies any new rash, pruritus, or lesions that are symptomatic, changing or bleeding, except as per HPI.  -Constitutional: The patient is feeling generally well.    Physical exam:  Vitals: There were no vitals taken for this visit.  GEN: This is a well developed, well-nourished female in no acute distress, in a pleasant mood.  SKIN: Focused examination of the face/head, neck, and L eyelid region were performed.  - 8.0 mm scaly pink papule on the L lower eyelid  - No other lesions of concern on areas examined.       Impression/Plan:  1. Basal cell carcinoma, superficial and nodular types, extending to the deep margin, L lower eyelid, 8.0 mm     Anticipate flap repair for reconstruction.    Reviewed pathology report and nature of diagnosis.     Risks, benefits, and process of Mohs micrographic surgery were discussed including possibility of damage to surrounding anatomical structures and infection. Patient is comfortable proceeding with the surgery; consent form was obtained. She will be scheduled at her convenience.    No indication for pre-op antibiotics.    Pre-op written instructions provided.     Follow-up as scheduled for MMS.       Staff Involved:    Scribe Disclosure  I, Gely Alegre, am serving as a scribe to document services personally performed by Dr. Tera Fatima, based on data collection and the provider's statements to me.     Attending Attestation  I attest that the Scribe recorded the interview and exam that I personally performed.  I have reviewed the note and edited it as necessary.    Tera Fatima  M.D.  Professor  Director of Dermatologic Surgery  Department of Dermatology  [facility]

## 2019-12-16 NOTE — PATIENT INSTRUCTIONS
Mohs Cutaneous Micrographic Surgery Unit  Tera Fatima MD      Pre-Surgical Instruction Sheet    1. Expect to be here majority of the morning.  The Mohs surgical procedure can be an extensive surgery requiring multiple stages. Each stage may take 1-2 hours.    ? You may eat breakfast  ? You may bring snacks or beverages with you  ? Take all normal medications morning of surgery -- unless otherwise indicated by your physician  ? If you have an artificial heart valve, or joint replacement within two years, we will prescribe an antibiotic. Please take one hour prior to surgery.    2. You will need a  to be with you if your surgical site is close to your eyes. You can get swelling/bruising immediately after surgery and will go home with a big bulky bandage that could obstruct your view of driving safely.    3. Wear comfortable clothing -- preferably a button down shirt or a loose fitted shirt. (to avoid pulling over pressure bandage off when you get home) If your surgery site is on your leg, try wearing loose fitted pants. If your surgery site is on your arm, try wearing a short-sleeve shirt.    4. Bring reading material or other items to help pass time. We do have internet access available if you own a laptop, iPad, etc.)    5. Women - do NOT wear make-up. We will be washing it off anyone needing surgery on the face    6. Do NOT stop blood thinning medication unless instructed to do so by your surgeon. This will include: Warfarin, Coumadin, Jantoven, Aspirin, Plavix and Pradaxa. If you are taking Warfarin or Coumadin, please have your INR blood test results sent to our office no more than 7 days prior to your surgery.     7. Tylenol is a good alternative to take for headaches and pain, and can be taken throughout your surgery and postoperative recovery.    8. If your surgery is on your trunk, arms, hands, legs, feet, fingernail or a toenail, please wash the area with Hibiclens, an over-the-counter antiseptic soap,  the night before and morning of your surgery.     If you have any questions, please feel free to contact us.    Phone numbers:  During business hours (M-F 8:00-4:30 p.m.)  [facility]:  [phone]  [facility]:   [phone] - goes straight to call center   ---------------------------------------------------------  Evenings/Weekends/Holidays  Hospital - [phone]   TTY for hearing [phone]  *Ask  to page dermatologist on-call  Emergency Ljdv-[phone]  TTY for hearing impaired- [phone]

## 2019-12-16 NOTE — NURSING NOTE
Chief Complaint   Patient presents with     Derm Problem     Debbie is here today for MOHS consult on her left lower eyelid due to BCC     Kristin Lindsey LPN

## 2020-01-06 ENCOUNTER — OFFICE VISIT (OUTPATIENT)
Dept: DERMATOLOGY | Facility: CLINIC | Age: 45
End: 2020-01-06
Payer: COMMERCIAL

## 2020-01-06 VITALS — HEART RATE: 85 BPM | DIASTOLIC BLOOD PRESSURE: 87 MMHG | SYSTOLIC BLOOD PRESSURE: 127 MMHG

## 2020-01-06 DIAGNOSIS — C44.1192 BASAL CELL CARCINOMA (BCC) OF LEFT LOWER EYELID: Primary | ICD-10-CM

## 2020-01-06 ASSESSMENT — PAIN SCALES - GENERAL
PAINLEVEL: NO PAIN (0)
PAINLEVEL: NO PAIN (0)

## 2020-01-06 NOTE — NURSING NOTE
The surgical site was dressed with Vaseline, Telfa and a pressure dressing. Wound care was gone over with the patient, all questions were answered.   Raven Vail CMA

## 2020-01-06 NOTE — LETTER
1/6/2020       RE: Debbie Hood  [address]  [place] [address]     Dear Colleague,    Thank you for referring your patient, Debbie Hood, to the [facility] DERMATOLOGIC SURGERY at [facility]. Please see a copy of my visit note below.    [facility] Mohs Dermatologic Surgery Procedure Note    Dermatology Surgery Clinic  [facility]  Clinics and Surgery Center  [address] [place] [id number]    Date of Service:  Jan 6, 2020  Surgery: Mohs micrographic surgery    Surgeon: Tera Fatima MD  Fellow: Dami Arango MD  Resident: Allison Ordoñez MD    Case 1  Repair Type: local flap (rotation)  Repair Size: 2.7 x 2.7 cm  Suture Material: Monocryl 5-0; Fast Absorbing Gut 5-0  Tumor Type: BCC - Basal cell carcinoma  Location: Left lower eyelid  Derm-Path Accession #: [id number]  PreOp Size: 1.3 x 1.0 cm  PostOp Size: 1.5 x 1.0 cm (stage I); 1.5 x 2.0 cm (stage II)  Mohs Accession #: [id number]  Level of Defect: fat      Procedure:  We discussed the principles of treatment and most likely complications including scarring, bleeding, infection, swelling, pain, crusting, nerve damage, large wound,  incomplete excision, wound dehiscence,  nerve damage, recurrence, and a second procedure may be recommended to obtain the best cosmetic or functional result.    Informed consent was obtained and the patient underwent the procedure as follows:  The patient was placed supine on the operating table.  The cancer was identified, outlined with a marker, and verified by the patient.  The entire surgical field was prepped with Iodine.  The surgical site was anesthetized using Lidocaine 1% with epi 1:100,000.    The area of clinically apparent tumor was not debulked. The layer of tissue was then surgically excised using a #15 blade and was then transferred onto a specimen sheet maintaining the orientation of the specimen. Hemostasis was obtained  using electrocoagulation. The wound site was then covered with a dressing while the tissue samples were processed for examination.    The excised tissue was transported to the Mohs histology laboratory maintaining the tissue orientation.  The tissue specimen was relaxed so that the entire surgical margin was in a a single horizontal plane for sectioning and inked for precise mapping.  A precise reference map was drawn to reflect the sectioning of the specimen, colored inking of the margins, and orientation on the patient.  The tissue was processed using horizontal sectioning of the base and continuous peripheral margins.  The histopathologic sections were reviewed in conjunction with the reference map.    Total blocks: 1    Total slides:  2    Residual tumor was identified and indicated in red on the reference map, identifying the location where further tissue excision was necessary. Therefore, an additional stage of Mohs Micrographic surgery was deemed necessary.     Stage II   The patient was returned to the operating room, and the area prepped in the usual manner. The residual tumor was excised using the reference map as a guide. The specimen was transfered to a labeled specimen sheet maintaining the orientation of the specimen. Hemostasis was obtained and the wound site was covered with a dressing while the tissue was processed for examination.     The excised tissue was transported to the Mohs histology laboratory maintaining orientation. The specimen margins were inked for precise mapping and a reference map was prepared for the is additional stage to maintain precise orientation as described above. The tissue was processed using horizontal sectioning of the base and continuous peripheral margins. The histopathologic sections were reviewed in conjunction with the reference map.     Total blocks: 1    Total slides:  2    There were no cancer cells visualized on examination, therefore Mohs surgery was complete.      Reconstruction: Rotation Flap    PROCEDURE  Due to the size and full-thickness nature of the defect, and the tightness of the surrounding skin with nearest tissue-laxity resevoir being laterally to medially to the defect, a rotation flap was planned.  After Hibiclens prep and local anesthesia, a large Burow s triangle was excised inferiorly to the defect and an incision made from the superior portion extending laterally. The full flap was then elevated by undermining in the subcutaneous plane. After hemostasis was obtained, the flap was rotated into place, trimmed to perfectly fit the defect, and sutured in a layered fashion (5-0 Monocryl buried vertical mattress sutures and simple running 5-0 fast absorbing gut sutures).    Repair Size: 2.7 x 2.7 cm    The wound was cleansed with saline and ointment was applied along the wound surface. A sterile pressure dressing was applied.  Wound care instructions were given verbally and in writing.  The patient left the operating suite in stable condition.      The attending surgeon was present for key, major portions of the procedure and always immediately available.                             Picture placed in chart for future reference.       Staff Involved:    Scribe Disclosure  I, Gely Alegre, am serving as a scribe to document services personally performed by Dr. Tera Fatima, based on data collection and the provider's statements to me.       Attending attestation:  I was present for key elements of the procedure and immediately available for all other portions of the procedure.  I have reviewed the note and edited it as necessary.    Tera Fatima M.D.  Professor  Director of Dermatologic Surgery  Department of Dermatology  [facility]    Dermatology Surgery Clinic  [facility] [address] [address]

## 2020-01-06 NOTE — PROGRESS NOTES
[facility] Mohs Dermatologic Surgery Procedure Note    Dermatology Surgery Clinic  [facility]  Clinics and Surgery Center  [address] [place] [id number]    Date of Service:  Jan 6, 2020  Surgery: Mohs micrographic surgery    Surgeon: Tera Fatima MD  Fellow: Dami Arango MD  Resident: Allison Ordoñez MD    Case 1  Repair Type: local flap (rotation)  Repair Size: 2.7 x 2.7 cm  Suture Material: Monocryl 5-0; Fast Absorbing Gut 5-0  Tumor Type: BCC - Basal cell carcinoma  Location: Left lower eyelid  Derm-Path Accession #: [id number]  PreOp Size: 1.3 x 1.0 cm  PostOp Size: 1.5 x 1.0 cm (stage I); 1.5 x 2.0 cm (stage II)  Mohs Accession #: [id number]  Level of Defect: fat      Procedure:  We discussed the principles of treatment and most likely complications including scarring, bleeding, infection, swelling, pain, crusting, nerve damage, large wound,  incomplete excision, wound dehiscence,  nerve damage, recurrence, and a second procedure may be recommended to obtain the best cosmetic or functional result.    Informed consent was obtained and the patient underwent the procedure as follows:  The patient was placed supine on the operating table.  The cancer was identified, outlined with a marker, and verified by the patient.  The entire surgical field was prepped with Iodine.  The surgical site was anesthetized using Lidocaine 1% with epi 1:100,000.    The area of clinically apparent tumor was not debulked. The layer of tissue was then surgically excised using a #15 blade and was then transferred onto a specimen sheet maintaining the orientation of the specimen. Hemostasis was obtained using electrocoagulation. The wound site was then covered with a dressing while the tissue samples were processed for examination.    The excised tissue was transported to the Mohs histology laboratory maintaining the tissue orientation.  The tissue specimen was relaxed so that the entire  surgical margin was in a a single horizontal plane for sectioning and inked for precise mapping.  A precise reference map was drawn to reflect the sectioning of the specimen, colored inking of the margins, and orientation on the patient.  The tissue was processed using horizontal sectioning of the base and continuous peripheral margins.  The histopathologic sections were reviewed in conjunction with the reference map.    Total blocks: 1    Total slides:  2    Residual tumor was identified and indicated in red on the reference map, identifying the location where further tissue excision was necessary. Therefore, an additional stage of Mohs Micrographic surgery was deemed necessary.     Stage II   The patient was returned to the operating room, and the area prepped in the usual manner. The residual tumor was excised using the reference map as a guide. The specimen was transfered to a labeled specimen sheet maintaining the orientation of the specimen. Hemostasis was obtained and the wound site was covered with a dressing while the tissue was processed for examination.     The excised tissue was transported to the Mohs histology laboratory maintaining orientation. The specimen margins were inked for precise mapping and a reference map was prepared for the is additional stage to maintain precise orientation as described above. The tissue was processed using horizontal sectioning of the base and continuous peripheral margins. The histopathologic sections were reviewed in conjunction with the reference map.     Total blocks: 1    Total slides:  2    There were no cancer cells visualized on examination, therefore Mohs surgery was complete.     Reconstruction: Rotation Flap    PROCEDURE  Due to the size and full-thickness nature of the defect, and the tightness of the surrounding skin with nearest tissue-laxity resevoir being laterally to medially to the defect, a rotation flap was planned.  After Hibiclens prep and local  anesthesia, a large Burow s triangle was excised inferiorly to the defect and an incision made from the superior portion extending laterally. The full flap was then elevated by undermining in the subcutaneous plane. After hemostasis was obtained, the flap was rotated into place, trimmed to perfectly fit the defect, and sutured in a layered fashion (5-0 Monocryl buried vertical mattress sutures and simple running 5-0 fast absorbing gut sutures).    Repair Size: 2.7 x 2.7 cm    The wound was cleansed with saline and ointment was applied along the wound surface. A sterile pressure dressing was applied.  Wound care instructions were given verbally and in writing.  The patient left the operating suite in stable condition.      The attending surgeon was present for key, major portions of the procedure and always immediately available.                             Picture placed in chart for future reference.       Staff Involved:    Scribe Disclosure  I, Gely Alegre, am serving as a scribe to document services personally performed by Dr. Tera Fatima, based on data collection and the provider's statements to me.       Attending attestation:  I was present for key elements of the procedure and immediately available for all other portions of the procedure.  I have reviewed the note and edited it as necessary.    Tera Fatima M.D.  Professor  Director of Dermatologic Surgery  Department of Dermatology  [facility]    Dermatology Surgery Clinic  [facility] [address] [address]

## 2020-01-06 NOTE — PATIENT INSTRUCTIONS
Wound Care Instructions  I will experience scar, altered skin color, bleeding, swelling, pain, crusting and redness. I may experience altered sensation. Risks are excessive bleeding, infection, muscle weakness, thick (hypertrophic or keloidal) scar, and recurrence,. A second procedure may be recommended to obtain the best cosmetic or functional result.  Possible complications of any surgical procedure are bleeding, infection, scarring, alteration in skin color and sensation, muscle weakness in the area, wound dehiscence or seperation, or recurrence of the lesion or disease. On occasion, after healing, a secondary procedure or revision may be recommended in order to obtain the best cosmetic or functional result.   After your surgery, a pressure bandage will be placed over the area that has sutures. This will help prevent bleeding.   For the First 48 hours After Surgery:  1. Leave the pressure bandage on and keep it dry. If it should come loose, you may retape it, but do not take it off.  2. Relax and take it easy. Do not do any vigorous exercise, heavy lifting, or bending forward. This could cause the wound to bleed.  3. Post-operative pain is usually mild. You may take plain or extra strength Tylenol every 4 hours as needed (do not take more than 4,000mg in one day). Do not take any medicine that contains aspirin, ibuprofen or motrin unless you have been recommended these by a doctor.  Avoid alcohol and vitamin E as these may increase your tendency to bleed.  4. You may put an ice pack around the bandaged area for 20 minutes every 2-3 hours. This may help reduce swelling, bruising, and pain. Make sure the ice pack is waterproof so that the pressure bandage does not get wet.   5. You may see a small amount of drainage or blood on your pressure bandage. This is normal. However, if drainage or bleeding continues or saturates the bandage, you will need to apply firm pressure over the bandage with a washcloth for 15  minutes. If bleeding continues after applying pressure for 15 minutes then go to the nearest emergency room.  48 Hours After Surgery  Carefully remove the bandage and start daily wound care and dressing changes. You may also now shower and get the wound wet. Wash wound with a mild soap and water.  Use caution when washing the wound. Be gentle and do not let the forceful shower stream hit the wound directly.  PAT dry.  Daily Wound Care:  1. Wash wound with a mild soap and water.  Use caution when washing the wound, be gentle and do not let the forceful shower stream hit the wound directly.  2. PAT DRY.  3. Apply Vaseline (from a new container or tube) over the suture line with a Q-tip. It is very important to keep the wound continuously moist, as wounds heal best in a moist environment.  4.  Keep the site covered until sutures are removed, you can cover it with a Telfa (non-stick) dressing and tape or a band-aid.    5. If you are unable to keep wound covered, you must apply Vaseline every 2 - 3 hours (while awake) to ensure it is being kept moist for optimal healing. A dressing overnight is recommended to keep the area moist.   Call Us If:  1. You have pain that is not controlled with Tylenol.  2. You have signs or symptoms of an infection, such as: fever over 100 degrees F, redness, warmth, or foul-smelling or yellow/creamy drainage from the wound.  Who should I call with questions?    [facility]: [phone]     [facility]: [phone]    For urgent needs outside of business hours call the [facility] at [phone] and ask for the dermatology resident on call

## 2020-01-06 NOTE — NURSING NOTE
Chief Complaint   Patient presents with     Skin Cancer     Debbie is here today to have MOHS on the mariela lower eyelid BCC.      Raven Vail, CMA

## 2020-01-07 ENCOUNTER — TELEPHONE (OUTPATIENT)
Dept: DERMATOLOGY | Facility: CLINIC | Age: 45
End: 2020-01-07

## 2020-01-07 NOTE — TELEPHONE ENCOUNTER
I called the patient and I left a message asking that she call back and let us know how she is doing after her procedure.   Raven Vail, [facility]

## 2020-01-21 ENCOUNTER — OFFICE VISIT (OUTPATIENT)
Dept: DERMATOLOGY | Facility: CLINIC | Age: 45
End: 2020-01-21
Payer: COMMERCIAL

## 2020-01-21 DIAGNOSIS — C44.1192 BASAL CELL CARCINOMA (BCC) OF LEFT LOWER EYELID: Primary | ICD-10-CM

## 2020-01-21 ASSESSMENT — PAIN SCALES - GENERAL: PAINLEVEL: NO PAIN (0)

## 2020-01-21 NOTE — LETTER
"1/21/2020       RE: Debbie Hood  [address]  [place] [address]     Dear Colleague,    Thank you for referring your patient, Debbie Hood, to the [facility] DERMATOLOGIC SURGERY at [facility]. Please see a copy of my visit note below.    [facility] Dermatology Note    Dermatology Surgery Clinic  [facility]  Clinics and Surgery Center  [address] [place] [id number]    Dermatology Problem List:  1. BCC, Left lower eyelid, s/p Bx 12/4/19 s/p MMS 01/06/20    Encounter Date: Jan 21, 2020    CC:  Chief Complaint   Patient presents with     Derm Problem     Debbie is here today for two week post op on her left lower eyelid, pt reports \"things are going good\"       History of Present Illness:  Ms. Debbie Hood is a 44 year old female who presents today for a followup from the previous surgery. Details are below.    Original Procedure Date:  January 6, 2020  Reason for visit:  MMS for BCC   Bleeding that required medical attention:  None  Infection:  None  Hospitalization for procedure within 30 days:  None  Are you having any functional problems since the surgery:  NA     Case(s) Specific Information:  Mohs Case 1:  Procedure Location:  Left lower eyelid  Type of Repair:  Local flap (rotation)  Is patient happy with appearance of scar: NA  On 1-10 scale, with 10 being how the area looked before the surgery and 1 being the worst imaginable scar, how do you think it looks today?: NA    The patient was last seen in clinic on 1/6/20 for MMS with local flap (rotation) repair on a BCC of the L lower eyelid. She comes in today for a 2-week post-op followup.    Today she reports that she has had no issues in the interim. She feels that the healing is going well. No discharge. She had tenderness on the day of surgery but this has since improved. Admits to some crusting at the corner but no significant tearing of the " eyes or eye irritation. No additional concerns. The patient is otherwise feeling well. There are no other skin concerns at this time.      Past Medical History:   Patient Active Problem List   Diagnosis     Abnormal Pap smear and cervical HPV (human papillomavirus)     Benign essential hypertension     Seasonal allergic rhinitis     Elevated blood pressure affecting pregnancy in third trimester, antepartum     IUD (intrauterine device) in place     Sebaceous cyst     Past Medical History:   Diagnosis Date     Hypertension     borderline high BP few years back while on OCP, now normal.     Hypertension in pregnancy, pre-existing 2013    Mild diastolic HTN - documented prior to pregnancy.  Baseline labs checked and normal.  Baseline 24-hr urine protein = 0.07g/24hr.      Mastitis, acute 10/2013     Preeclampsia complicating hypertension 10/2013    IOL at term     Past Surgical History:   Procedure Laterality Date     HC CLOSED TX ELBOW DISLOCATION W/O ANESTHESIA       TONSILLECTOMY & ADENOIDECTOMY         Social History:  Social History     Socioeconomic History     Marital status:      Spouse name: Khai     Number of children: 1     Years of education: 18     Highest education level: Not on file   Occupational History     Occupation:      Employer: [employer]   Social Needs     Financial resource strain: Not on file     Food insecurity:     Worry: Not on file     Inability: Not on file     Transportation needs:     Medical: Not on file     Non-medical: Not on file   Tobacco Use     Smoking status: Former Smoker     Packs/day: 0.00     Types: Cigarettes     Last attempt to quit: 2008     Years since quittin.4     Smokeless tobacco: Never Used     Tobacco comment: 5 cigs a day   Substance and Sexual Activity     Alcohol use: Yes     Alcohol/week: 0.0 standard drinks     Comment: 2 drinks a week     Drug use: No     Sexual activity: Yes     Partners: Male     Birth  control/protection: I.U.D.   Lifestyle     Physical activity:     Days per week: Not on file     Minutes per session: Not on file     Stress: Not on file   Relationships     Social connections:     Talks on phone: Not on file     Gets together: Not on file     Attends [employer] service: Not on file     Active member of club or organization: Not on file     Attends meetings of clubs or organizations: Not on file     Relationship status: Not on file     Intimate partner violence:     Fear of current or ex partner: Not on file     Emotionally abused: Not on file     Physically abused: Not on file     Forced sexual activity: Not on file   Other Topics Concern     Parent/sibling w/ CABG, MI or angioplasty before 65F 55M? 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 Measure daily and if 160/110 go to hospital. 1 Units 0     triamcinolone (NASACORT) 55 MCG/ACT Inhaler Spray 2 sprays into both nostrils daily         Allergies   Allergen Reactions     Kiwi        Review of Systems:  As per HPI.  -Skin Establ Pt: The patient denies any new rash, pruritus, or lesions that are symptomatic, changing or bleeding, except as per HPI.  -Constitutional: The patient is feeling generally well.    Physical exam:  Vitals: There were no vitals taken for this visit.  GEN: This is a well developed, well-nourished female in no acute distress, in a pleasant mood.     SKIN: Focused examination of the face/head, neck, and L lower eyelid were performed.  - Well-healed, fully viable flap on the L lower eyelid; no evidence of infection  - No other lesions of concern on areas examined.       Impression/Plan:    1. BCC of the L lower eyelid, s/p MMS 01/06/20  Upon review of the surgical site the patient had a well-healed fully viable flap with no evidence of infection. OK to discontinue wound care. Counseled the patient on scar massage and silicone gel application.     Counseled the patient on scar massage with Vaseline at the surgical site for 3-5 minutes twice daily, as well as silicone gel application.    OK to discontinue wound care.    The patient can return to normal activities and can use regular facial products.    Recommended sunscreens SPF #30 or greater and protective clothing. Risk of scar dyspigmentation discussed.     Continue periodic self skin exams and report of any new or changing lesions.     Please refer to previous clinic note for details     Follow-up in 3 months.        Staff Involved:    Scribe Disclosure  I, Gely Alegre, am serving as a scribe to document services personally performed by Dr. Tera Fatima, based on data collection and the provider's statements to me.     Attending Attestation  I attest that the Scribe recorded the interview and exam that I personally performed.  I have reviewed the note and edited it as necessary.    Tera Fatima M.D.  Professor  Director of Dermatologic Surgery  Department of Dermatology  [facility]

## 2020-01-21 NOTE — NURSING NOTE
"Chief Complaint   Patient presents with     Derm Problem     Debbie is here today for two week post op on her left lower eyelid, pt reports \"things are going good\"     Kristin Lindsey, KATHY    "

## 2020-01-21 NOTE — PROGRESS NOTES
"[facility] Dermatology Note    Dermatology Surgery Clinic  [facility]  Clinics and Surgery Center  [address] [place] [id number]    Dermatology Problem List:  1. BCC, Left lower eyelid, s/p Bx 12/4/19 s/p MMS 01/06/20    Encounter Date: Jan 21, 2020    CC:  Chief Complaint   Patient presents with     Derm Problem     Debbie is here today for two week post op on her left lower eyelid, pt reports \"things are going good\"       History of Present Illness:  Ms. Debbie Hood is a 44 year old female who presents today for a followup from the previous surgery. Details are below.    Original Procedure Date: January 6, 2020  Reason for visit: MMS for BCC   Bleeding that required medical attention: None  Infection: None  Hospitalization for procedure within 30 days: None  Are you having any functional problems since the surgery: NA     Case(s) Specific Information:  Mohs Case 1:  Procedure Location: Left lower eyelid  Type of Repair: Local flap (rotation)  Is patient happy with appearance of scar: NA  On 1-10 scale, with 10 being how the area looked before the surgery and 1 being the worst imaginable scar, how do you think it looks today?: NA    The patient was last seen in clinic on 1/6/20 for MMS with local flap (rotation) repair on a BCC of the L lower eyelid. She comes in today for a 2-week post-op followup.    Today she reports that she has had no issues in the interim. She feels that the healing is going well. No discharge. She had tenderness on the day of surgery but this has since improved. Admits to some crusting at the corner but no significant tearing of the eyes or eye irritation. No additional concerns. The patient is otherwise feeling well. There are no other skin concerns at this time.      Past Medical History:   Patient Active Problem List   Diagnosis     Abnormal Pap smear and cervical HPV (human papillomavirus)     Benign essential hypertension     " Seasonal allergic rhinitis     Elevated blood pressure affecting pregnancy in third trimester, antepartum     IUD (intrauterine device) in place     Sebaceous cyst     Past Medical History:   Diagnosis Date     Hypertension     borderline high BP few years back while on OCP, now normal.     Hypertension in pregnancy, pre-existing 2013    Mild diastolic HTN - documented prior to pregnancy.  Baseline labs checked and normal.  Baseline 24-hr urine protein = 0.07g/24hr.      Mastitis, acute 10/2013     Preeclampsia complicating hypertension 10/2013    IOL at term     Past Surgical History:   Procedure Laterality Date     HC CLOSED TX ELBOW DISLOCATION W/O ANESTHESIA       TONSILLECTOMY & ADENOIDECTOMY         Social History:  Social History     Socioeconomic History     Marital status:      Spouse name: Khai     Number of children: 1     Years of education: 18     Highest education level: Not on file   Occupational History     Occupation:      Employer: [employer]   Social Needs     Financial resource strain: Not on file     Food insecurity:     Worry: Not on file     Inability: Not on file     Transportation needs:     Medical: Not on file     Non-medical: Not on file   Tobacco Use     Smoking status: Former Smoker     Packs/day: 0.00     Types: Cigarettes     Last attempt to quit: 2008     Years since quittin.4     Smokeless tobacco: Never Used     Tobacco comment: 5 cigs a day   Substance and Sexual Activity     Alcohol use: Yes     Alcohol/week: 0.0 standard drinks     Comment: 2 drinks a week     Drug use: No     Sexual activity: Yes     Partners: Male     Birth control/protection: I.U.D.   Lifestyle     Physical activity:     Days per week: Not on file     Minutes per session: Not on file     Stress: Not on file   Relationships     Social connections:     Talks on phone: Not on file     Gets together: Not on file     Attends Protestant service: Not on file     Active  member of club or organization: Not on file     Attends meetings of clubs or organizations: Not on file     Relationship status: Not on file     Intimate partner violence:     Fear of current or ex partner: Not on file     Emotionally abused: Not on file     Physically abused: Not on file     Forced sexual activity: Not on file   Other Topics Concern     Parent/sibling w/ CABG, MI or angioplasty before 65F 55M? 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 Measure daily and if 160/110 go to hospital. 1 Units 0     triamcinolone (NASACORT) 55 MCG/ACT Inhaler Spray 2 sprays into both nostrils daily         Allergies   Allergen Reactions     Kiwi        Review of Systems:  As per HPI.  -Skin Establ Pt: The patient denies any new rash, pruritus, or lesions that are symptomatic, changing or bleeding, except as per HPI.  -Constitutional: The patient is feeling generally well.    Physical exam:  Vitals: There were no vitals taken for this visit.  GEN: This is a well developed, well-nourished female in no acute distress, in a pleasant mood.    SKIN: Focused examination of the face/head, neck, and L lower eyelid were performed.  - Well-healed, fully viable flap on the L lower eyelid; no evidence of infection  - No other lesions of concern on areas examined.       Impression/Plan:    1. BCC of the L lower eyelid, s/p MMS 01/06/20  Upon review of the  surgical site the patient had a well-healed fully viable flap with no evidence of infection. OK to discontinue wound care. Counseled the patient on scar massage and silicone gel application.     Counseled the patient on scar massage with Vaseline at the surgical site for 3-5 minutes twice daily, as well as silicone gel application.    OK to discontinue wound care.    The patient can return to normal activities and can use regular facial products.    Recommended sunscreens SPF #30 or greater and protective clothing. Risk of scar dyspigmentation discussed.     Continue periodic self skin exams and report of any new or changing lesions.     Please refer to previous clinic note for details     Follow-up in 3 months.        Staff Involved:    Scribe Disclosure  I, Gely Alegre, am serving as a scribe to document services personally performed by Dr. Tera Fatima, based on data collection and the provider's statements to me.     Attending Attestation  I attest that the Scribe recorded the interview and exam that I personally performed.  I have reviewed the note and edited it as necessary.    Tera Fatima M.D.  Professor  Director of Dermatologic Surgery  Department of Dermatology  [facility]

## 2020-03-02 ENCOUNTER — OFFICE VISIT (OUTPATIENT)
Dept: PHARMACY | Facility: PHYSICIAN GROUP | Age: 45
End: 2020-03-02
Payer: COMMERCIAL

## 2020-03-02 VITALS
HEART RATE: 83 BPM | TEMPERATURE: 98.4 F | OXYGEN SATURATION: 96 % | BODY MASS INDEX: 29.56 KG/M2 | DIASTOLIC BLOOD PRESSURE: 85 MMHG | RESPIRATION RATE: 18 BRPM | HEIGHT: 65 IN | SYSTOLIC BLOOD PRESSURE: 128 MMHG | WEIGHT: 177.4 LBS

## 2020-03-02 DIAGNOSIS — I10 BENIGN ESSENTIAL HYPERTENSION: Primary | ICD-10-CM

## 2020-03-02 PROCEDURE — 99207 ZZC NO CHARGE LOS: CPT | Performed by: PHARMACIST

## 2020-03-02 ASSESSMENT — MIFFLIN-ST. JEOR: SCORE: 1460.56

## 2020-03-02 NOTE — PROGRESS NOTES
Clinical Pharmacy Consult:                                                    Debbie Hood is a 44 year old female coming in for a clinical pharmacist consult.  She was referred to me from Nneka Hylton MD .     Reason for Consult: Blood pressure machine     Med Rec:  Medication list is accurate.    Denies any OTC meds outside of the occasional IBU or APAP.    No Herbal products.        Discussion:   Debbie is a 45 yo woman with a history of high blood pressure.  She does not currently take medication for blood pressure and has tried to control this with diet and lifestyle.  She is brought in her blood pressure cuff today to ask if this is an appropriate cuff for her to use.    Blood pressure cuff is an [employer] cuff with a manual inflated bladder.  The cuff is a good working order and has a memory.  The patient measures her blood pressures on her own.  She also keeps good detailed accounts of her blood pressures to keep some recorded on her computer.  She is brought in her blood pressures for the last several years for today's visit.    Patient's arm is 28.5 cm in circumference.  This falls into the appropriate range for her size blood pressure cuff/bladder.      Today I spent time describing appropriate technique for home blood pressure monitoring.  Recommend to do the following:    Sit in a chair with the back rest for 5 minutes before    Feet on the floor    Avoid talking    Rest arm, do not inflate the cuff with the same arm that is being measured    Avoid caffeine before measuring.    I have also reviewed goals for blood pressure.  I explained that her systolic blood pressure goal is to be less than 120 mmHg.  She understands that a systolic blood pressure greater than 130 would qualify as hypertension, but that medication therapy is usually not initiated until systolic blood pressures are greater than 140 mmHg.    Impression:  Today Debbie's blood pressure is controlled in clinic.  She is here  mainly for education on how to improve her blood pressure and what thresholds might trigger starting medication.  I've explained blood pressure goals and targets and I reviewed appropriate blood pressure monitoring technique.  I believe the blood pressure which she has at home is appropriate for measure and giving her tips on how to measure for her following appointment with Dr. Hylton at [facility].      Plan:  1.  Begin monitoring blood pressure twice in the morning and twice in the evening  2.  Bring blood pressure values to your next appointment with Dr. Hylton  3.  Consider increasing exercise to 3 or 4 days/week.  I congratulated the patient on exercising 60 minutes each week.  4.  Avoid meals with high levels of salt, as well as snacks  5.  Target a 5% with over the next few months.      Follow up with Dr. Hylton on 3/5/20      Jayden Snowden, Pharm.D.

## 2020-03-02 NOTE — Clinical Note
Dr. Hylton,I met with Debbie to discuss blood pressure.  This was mainly an educational meeting.  No medication changes recommended at this time.Jayden Snowden, Pharm.D.

## 2020-03-05 ENCOUNTER — OFFICE VISIT (OUTPATIENT)
Dept: FAMILY MEDICINE | Facility: CLINIC | Age: 45
End: 2020-03-05
Payer: COMMERCIAL

## 2020-03-05 VITALS
SYSTOLIC BLOOD PRESSURE: 116 MMHG | WEIGHT: 178 LBS | DIASTOLIC BLOOD PRESSURE: 84 MMHG | BODY MASS INDEX: 29.66 KG/M2 | HEIGHT: 65 IN | RESPIRATION RATE: 18 BRPM | TEMPERATURE: 98.2 F | HEART RATE: 79 BPM | OXYGEN SATURATION: 95 %

## 2020-03-05 DIAGNOSIS — N94.10 DYSPAREUNIA, FEMALE: ICD-10-CM

## 2020-03-05 DIAGNOSIS — Z97.5 IUD (INTRAUTERINE DEVICE) IN PLACE: ICD-10-CM

## 2020-03-05 DIAGNOSIS — I10 BENIGN ESSENTIAL HYPERTENSION: Primary | ICD-10-CM

## 2020-03-05 ASSESSMENT — MIFFLIN-ST. JEOR: SCORE: 1463.28

## 2020-03-05 ASSESSMENT — ENCOUNTER SYMPTOMS
HEMATURIA: 0
DIARRHEA: 0
NAUSEA: 0
DIZZINESS: 0
CHILLS: 0
SINUS PRESSURE: 0
SORE THROAT: 0
DYSURIA: 0
FEVER: 0
SHORTNESS OF BREATH: 0
HEADACHES: 0
VOMITING: 0
FREQUENCY: 0

## 2020-03-05 NOTE — PROGRESS NOTES
HPI       Debbie Hood is a 44 year old  who presents for birth control consult and   Chief Complaint   Patient presents with     Consult     Birth control     Birth Control Consult  Debbie is a 44 year old woman here for birth control consult. She currently uses a Mirena that was placed May 23, 2017. Currently, she has no complaints about her birth control, no new pain from her IUD, no abnormal bleeding, and no vaginal discharge. She used to be on OCPs for decades prior to having her children. She is done having children now and would like to know what the best plan for her contraception moving forward would be. She has had minimal changes in mood status post mirena placement, but having kids has placed stress on her life. Her kids are getting older now and she does attribute some of her stress to them. She would like to know if OCPs would make a difference in her mood. Her mood today is fine, with good eye contact, and congruent affect. She is currently seeing a therapist as a couple, but has not seen a therapist for her own mental health. Her sleep is great and she has no changes in interests. She is talking with her  about getting a vasectomy, but it is unknown of whether this will happen.      Menstrual Cycles:  Periods fairly regular last 4-5 days. Bleeding more at the start. Start to start of the next cycle is 4 weeks. Last menstrual period the 22nd of of February. Pain during periods non-existent.     Dysorgasmia  Recently, Debbie has developed a new pain with orgasm. The pain has only occurred twice and is a dull crampy pain in the right lower abdomen pain.  She rates the pain a 2-3/10. The pain lasts about 10 seconds. This has never happened before. She recently began a more serious exercise program. She does not have the pain at other times of the day. She has no nausea, vomiting, diarrhea. In clinic today, she has no pain at all.      +++++++    Adherence and  "Exercise  Exercise:biking, strength training and running 1 day/week for an average of 45-60 minutes      Blood Pressure, recording at home  Per pharmacy, she has been recording her blood pressures daily, making sure to wait until her heart rate settles down. Her most recent pressures are below  AM      116/90  122/91  118/84  114/89  120/86  126/90    PM  124/91  133/89  129/91  137/90  126/94  120/86    Problem, Medication and Allergy Lists were reviewed and updated if needed..    Patient is an established patient of this clinic..         Review of Systems:   Review of Systems   Constitutional: Negative for chills and fever.   HENT: Negative for ear pain, sinus pressure and sore throat.    Respiratory: Negative for shortness of breath.    Cardiovascular: Negative for chest pain.   Gastrointestinal: Negative for diarrhea, nausea and vomiting.   Genitourinary: Negative for dysuria, frequency, hematuria, vaginal bleeding and vaginal discharge.   Neurological: Negative for dizziness and headaches.            Physical Exam:     Vitals:    03/05/20 1556 03/05/20 1557   BP: 125/89 116/84   Pulse: 79    Resp: 18    Temp: 98.2  F (36.8  C)    TempSrc: Oral    SpO2: 95%    Weight: 80.7 kg (178 lb)    Height: 1.651 m (5' 5\")      Body mass index is 29.62 kg/m .  Vitals were reviewed and were normal     Physical Exam  Cardiovascular:      Rate and Rhythm: Normal rate and regular rhythm.      Pulses: Normal pulses.      Heart sounds: Normal heart sounds.   Pulmonary:      Effort: Pulmonary effort is normal.      Breath sounds: Normal breath sounds.           Results:    Results from this visitNo results found for any visits on 03/05/20.    Assessment and Plan       Birth Control Consult  Premenstrual Dysphoric Disorder  Debbie is a 44 year old mother who currently has Mirena placed, due for removal in 2024. She is inquiring about the possibility of mood symptoms related to her Mirena as well as birth control options. She " currently has light periods with Mirena that are regular and 2/10 pain. She would like to continue with the Mirena currently, while considering OCPs or cyclic antidepressents in the future. She will also continue to talk to her  about possible vasectomy.  Plan:  -Journal for mood  -Continue with Mirena  -Discuss vasectomy with   -Consider OCPs or cyclic medications in future    Dyspareunia   Recently, she has had two bouts of 2-3/10 RLQ abdominal pain that lasts for 10 seconds during orgasm. This has never happened to her before and she also notes that she recently started an exercise program. She has no pain today, making an ovarian torsion unlikely. The most likely etiology of her pain is a muscle strain due to increased workout schedule.   Plan:  -Monitor for increased or prolonged abdominal pain   -Encourage hydration and stretching between workouts    Blood Pressure  Currently tracking blood pressure due to recent high-normal values per pharmacy  Plan:  -Continue to track blood pressure as done previously  There are no discontinued medications.     Milan Jean, MS3    Options for treatment and follow-up care were reviewed with the patient. Debbie Hood  engaged in the decision making process and verbalized understanding of the options discussed and agreed with the final plan.    I was present with the medical student who participated in the service and in the documentation of this note. I have verified the history and personally performed the physical exam and medical decision making, and have verified the content of the note, which accurately reflects my assessment of the patient and the plan of care.     Nneka Hylton MD

## 2020-03-05 NOTE — PATIENT INSTRUCTIONS
1) Journal for mood  2) Continue with Mirena  3) Discuss vasectomy with   4)Consider OCPs or cyclic medications in future  5) Continue to track blood pressure  6) Monitor for increased or prolonged abdominal pain   7) Hydration and stretching between workouts

## 2020-04-15 ENCOUNTER — TELEPHONE (OUTPATIENT)
Dept: DERMATOLOGY | Facility: CLINIC | Age: 45
End: 2020-04-15

## 2020-04-15 NOTE — TELEPHONE ENCOUNTER
Attempted to call patient, no answer. LVM on patient identified voicemail informing patient that due to COVID-19 we are offering telephone or video visits. Patient can call back to confirm which route she would like to take. Call back number provided, [employer] for Google Glass message sent as well.    Kristin Lindsey LPN

## 2020-04-21 ENCOUNTER — VIRTUAL VISIT (OUTPATIENT)
Dept: DERMATOLOGY | Facility: CLINIC | Age: 45
End: 2020-04-21
Payer: COMMERCIAL

## 2020-04-21 ENCOUNTER — TELEPHONE (OUTPATIENT)
Dept: DERMATOLOGY | Facility: CLINIC | Age: 45
End: 2020-04-21

## 2020-04-21 DIAGNOSIS — C44.1192 BASAL CELL CARCINOMA (BCC) OF LEFT LOWER EYELID: Primary | ICD-10-CM

## 2020-04-21 RX ORDER — TRIAMCINOLONE ACETONIDE 55 UG/1
SPRAY, METERED NASAL
COMMUNITY
End: 2021-06-02

## 2020-04-21 ASSESSMENT — PAIN SCALES - GENERAL: PAINLEVEL: NO PAIN (1)

## 2020-04-21 NOTE — NURSING NOTE
Chief Complaint   Patient presents with     Derm Problem     Debbie is following up 3 months post mohs, complains of a couple bumps still lingering      Kristin Lindsey LPN

## 2020-04-21 NOTE — TELEPHONE ENCOUNTER
Attempted to call patient for their visit, no answer. LVM for patient to call back.    Kristin Lindsey LPN

## 2020-04-21 NOTE — PROGRESS NOTES
[facility] Method of transmission:  Store and Forward and Telephone  Dermatologic Surgery Telephone Consult Note  4/21/2020       Impression and Recommendations (Patient Counseled on the Following):  1. BCC of the L lower eyelid, s/p MMS 01/06/20  Well healed advancement flap. Mild erythema overlying the suture line with minimal texture change where deep sutures persist.     Instructed to increase scar massage to BID    Discussed PDL; will consider at f/u in 2-3 months.     Recommended sunscreens SPF #50 or greater and protective clothing. Risk of scar dyspigmentation discussed.     Continue periodic self skin exams and report of any new or changing lesions.     Please refer to previous clinic note for details regarding treatment.      Follow-up: 2-3 months for consideration of PDL.      Fellow/Resident: Dami Arango MD     Patient was seen/discussed with: MD Dami Hurst MD  PGY5 Dermatology  Mohs Surgery Fellow  [phone]     _____________________________________________________________________________    Dermatology Problem List:  1. BCC, Left lower eyelid, s/p Bx 12/4/19 s/p MMS 01/06/20 with advancement flap.    Encounter Date: Apr 21, 2020    CC:   Chief Complaint   Patient presents with     Derm Problem     Debbie is following up 3 months post mohs, complains of a couple bumps still lingering        History of Present Illness:  I have reviewed the teledermatology information and the nursing intake corresponding to this issue. Debbie Hood is a 44 year old female who presents via teledermatology for dermatologic surgery follow-up. Today, Today she reports that she has had no issues with healing. Notes mild erythema and bumpiness of the suture line. Performing daily scar massage with some improvement. Not interested in revision at this point, but may consider in 2-3 months.  No additional concerns. The patient is otherwise feeling well. There are no other  skin concerns at this time.  Previous surgery Details are below.       Procedure Location: Left lower eyelid  Type of Repair: Local flap (rotation)   Procedure: 1/6/20 MMS with local flap (rotation) repair on a BCC of the L lower eyelid.        ROS: Patient is generally feeling well today.     Physical Examination:  Skin: Focused examination of the face within the teledermatology photograph(s)* was performed.   -Advancement flap fully healed. Mild erythema overlying the suture line with minimal texture change where deep sutures persist.     Past Medical History:   Patient Active Problem List   Diagnosis     Abnormal Pap smear and cervical HPV (human papillomavirus)     Benign essential hypertension     Seasonal allergic rhinitis     Elevated blood pressure affecting pregnancy in third trimester, antepartum     IUD (intrauterine device) in place     Sebaceous cyst     Past Medical History:   Diagnosis Date     Hypertension     borderline high BP few years back while on OCP, now normal.     Hypertension in pregnancy, pre-existing 2/26/2013    Mild diastolic HTN - documented prior to pregnancy.  Baseline labs checked and normal.  Baseline 24-hr urine protein = 0.07g/24hr.      Mastitis, acute 10/2013     Preeclampsia complicating hypertension 10/2013    IOL at term     Past Surgical History:   Procedure Laterality Date     HC CLOSED TX ELBOW DISLOCATION W/O ANESTHESIA  1990     TONSILLECTOMY & ADENOIDECTOMY  1993       Family History:  Family History   Problem Relation Age of Onset     Hypertension Father      Breast Cancer Paternal Grandmother      Thyroid Disease Maternal Grandmother      Depression Sister      Melanoma No family hx of        Medications:  Current Outpatient Medications   Medication     levonorgestrel (MIRENA) 20 MCG/24HR IUD     order for DME     triamcinolone (NASACORT) 55 MCG/ACT Inhaler     triamcinolone (NASACORT) 55 MCG/ACT nasal aerosol     No current facility-administered medications for this  visit.           Allergies   Allergen Reactions     Kiwi        _____________________________________________________________________________    Teledermatology information:  - Location of patient: Home  - Patient presented as: follow-up  - Location of teledermatologist:  [facility] DERMATOLOGIC SURGERY )  - Reason teledermatology is appropriate:  of National Emergency Regarding Coronavirus disease (COVID 19) Outbreak  - Image quality and interpretability: Satisfactory  - Physician has received verbal consent for a Video/Photos Visit from the patient? Yes  - In-person dermatology visit recommendation: as above.   - Date of images: 4/21/2020   - Service start time: 1:25  - Service end time: 1:32  - Total time: 6 min  - Date of report: 04/13/20    Attending Attestation  I attest that the fellow recorded the interview and exam that we performed together.  I have reviewed the note and edited it as necessary.  I was on the call for it's entirety.    Tera Fatima M.D.  Professor  Director of Dermatologic Surgery  Department of Dermatology  [facility]

## 2020-11-29 ENCOUNTER — HEALTH MAINTENANCE LETTER (OUTPATIENT)
Age: 45
End: 2020-11-29

## 2021-01-15 ENCOUNTER — HEALTH MAINTENANCE LETTER (OUTPATIENT)
Age: 46
End: 2021-01-15

## 2021-06-02 ENCOUNTER — OFFICE VISIT (OUTPATIENT)
Dept: FAMILY MEDICINE | Facility: CLINIC | Age: 46
End: 2021-06-02
Payer: COMMERCIAL

## 2021-06-02 VITALS
SYSTOLIC BLOOD PRESSURE: 128 MMHG | OXYGEN SATURATION: 97 % | TEMPERATURE: 98.8 F | BODY MASS INDEX: 27.8 KG/M2 | DIASTOLIC BLOOD PRESSURE: 87 MMHG | HEART RATE: 85 BPM | RESPIRATION RATE: 16 BRPM | WEIGHT: 173 LBS | HEIGHT: 66 IN

## 2021-06-02 DIAGNOSIS — Z00.00 ROUTINE GENERAL MEDICAL EXAMINATION AT A HEALTH CARE FACILITY: Primary | ICD-10-CM

## 2021-06-02 DIAGNOSIS — Z13.9 SCREENING FOR CONDITION: ICD-10-CM

## 2021-06-02 DIAGNOSIS — I10 BENIGN ESSENTIAL HYPERTENSION: ICD-10-CM

## 2021-06-02 LAB
ANION GAP SERPL CALCULATED.3IONS-SCNC: 2 MMOL/L (ref 3–14)
BUN SERPL-MCNC: 11 MG/DL (ref 7–30)
CALCIUM SERPL-MCNC: 9.3 MG/DL (ref 8.5–10.1)
CHLORIDE SERPL-SCNC: 107 MMOL/L (ref 94–109)
CO2 SERPL-SCNC: 27 MMOL/L (ref 20–32)
CREAT SERPL-MCNC: 0.68 MG/DL (ref 0.52–1.04)
ERYTHROCYTE [DISTWIDTH] IN BLOOD BY AUTOMATED COUNT: 13.2 % (ref 10–15)
GFR SERPL CREATININE-BSD FRML MDRD: >90 ML/MIN/{1.73_M2}
GLUCOSE SERPL-MCNC: 90 MG/DL (ref 70–99)
HBA1C MFR BLD: 5.4 % (ref 0–5.6)
HCT VFR BLD AUTO: 45.4 % (ref 35–47)
HCV AB SERPL QL IA: NONREACTIVE
HGB BLD-MCNC: 14.3 G/DL (ref 11.7–15.7)
MCH RBC QN AUTO: 28.3 PG (ref 26.5–33)
MCHC RBC AUTO-ENTMCNC: 31.5 G/DL (ref 31.5–36.5)
MCV RBC AUTO: 90 FL (ref 78–100)
PLATELET # BLD AUTO: 274 10E9/L (ref 150–450)
POTASSIUM SERPL-SCNC: 4.7 MMOL/L (ref 3.4–5.3)
RBC # BLD AUTO: 5.05 10E12/L (ref 3.8–5.2)
SODIUM SERPL-SCNC: 137 MMOL/L (ref 133–144)
TSH SERPL DL<=0.005 MIU/L-ACNC: 3.25 MU/L (ref 0.4–4)
WBC # BLD AUTO: 8.4 10E9/L (ref 4–11)

## 2021-06-02 PROCEDURE — 84443 ASSAY THYROID STIM HORMONE: CPT | Performed by: FAMILY MEDICINE

## 2021-06-02 PROCEDURE — 80048 BASIC METABOLIC PNL TOTAL CA: CPT | Performed by: FAMILY MEDICINE

## 2021-06-02 PROCEDURE — 85027 COMPLETE CBC AUTOMATED: CPT | Performed by: FAMILY MEDICINE

## 2021-06-02 PROCEDURE — 99396 PREV VISIT EST AGE 40-64: CPT | Performed by: FAMILY MEDICINE

## 2021-06-02 PROCEDURE — 83036 HEMOGLOBIN GLYCOSYLATED A1C: CPT | Performed by: FAMILY MEDICINE

## 2021-06-02 PROCEDURE — 36415 COLL VENOUS BLD VENIPUNCTURE: CPT | Performed by: FAMILY MEDICINE

## 2021-06-02 PROCEDURE — 86803 HEPATITIS C AB TEST: CPT | Performed by: FAMILY MEDICINE

## 2021-06-02 RX ORDER — ACETAMINOPHEN 325 MG/1
325-650 TABLET ORAL EVERY 6 HOURS PRN
COMMUNITY
End: 2022-01-28

## 2021-06-02 RX ORDER — MULTIPLE VITAMINS W/ MINERALS TAB 9MG-400MCG
1 TAB ORAL DAILY
COMMUNITY

## 2021-06-02 ASSESSMENT — MIFFLIN-ST. JEOR: SCORE: 1438.53

## 2021-06-02 NOTE — PROGRESS NOTES
Female Physical Note          HPI         Concerns today: no major concerns, should follow up on BP, having some occasional abd pain    BPs at home  116-132/88-94    Patient Active Problem List   Diagnosis     Abnormal Pap smear and cervical HPV (human papillomavirus)     Benign essential hypertension     Seasonal allergic rhinitis     Elevated blood pressure affecting pregnancy in third trimester, antepartum     IUD (intrauterine device) in place     Sebaceous cyst       Past Medical History:   Diagnosis Date     Hypertension     borderline high BP few years back while on OCP, now normal.     Hypertension in pregnancy, pre-existing 2/26/2013    Mild diastolic HTN - documented prior to pregnancy.  Baseline labs checked and normal.  Baseline 24-hr urine protein = 0.07g/24hr.      Mastitis, acute 10/2013     Preeclampsia complicating hypertension 10/2013    IOL at term     Skin cancer of eyelid, left 01/2020        Family History   Problem Relation Age of Onset     Hypertension Father      Breast Cancer Paternal Grandmother      Thyroid Disease Maternal Grandmother      Depression Sister      Melanoma No family hx of               Review of Systems:     Review of Systems:  CONSTITUTIONAL: NEGATIVE for fever, chills, change in weight  INTEGUMENTARY/SKIN: NEGATIVE for worrisome rashes, moles or lesions  EYES: some reading issues - needs bifocals  ENT/MOUTH: NEGATIVE for ear, mouth and throat problems  RESP: NEGATIVE for significant cough or SOB  BREAST: NEGATIVE for masses, tenderness or discharge  CV: NEGATIVE for chest pain, palpitations or peripheral edema  GI: NEGATIVE for nausea, abdominal pain, heartburn, or change in bowel habits  - did have a painful episode a little while ago in the RUQ, hasn't recurred  : NEGATIVE for frequency, dysuria, or hematuria  MUSCULOSKELETAL: NEGATIVE for significant arthralgias or myalgia  NEURO: NEGATIVE for weakness, dizziness or paresthesias  ENDOCRINE: NEGATIVE for  temperature intolerance, skin/hair changes  HEME/ALLERGY: NEGATIVE for bleeding problems  PSYCHIATRIC: NEGATIVE for changes in mood or affect  Sleep:   Do you snore most or the night (as reported by a family member)? No  Do you feel sleepy or extremely tired during most of the day? No           Social History     Social History     Socioeconomic History     Marital status:      Spouse name: Khai     Number of children: 1     Years of education: 18     Highest education level: Not on file   Occupational History     Occupation:      Employer: [employer]   Social Needs     Financial resource strain: Not on file     Food insecurity     Worry: Not on file     Inability: Not on file     Transportation needs     Medical: Not on file     Non-medical: Not on file   Tobacco Use     Smoking status: Former Smoker     Packs/day: 0.00     Types: Cigarettes     Quit date: 2008     Years since quittin.8     Smokeless tobacco: Never Used     Tobacco comment: 5 cigs a day   Substance and Sexual Activity     Alcohol use: Yes     Alcohol/week: 0.0 standard drinks     Comment: 2 drinks a week     Drug use: No     Sexual activity: Yes     Partners: Male     Birth control/protection: I.U.D.   Lifestyle     Physical activity     Days per week: Not on file     Minutes per session: Not on file     Stress: Not on file   Relationships     Social connections     Talks on phone: Not on file     Gets together: Not on file     Attends Yazidism service: Not on file     Active member of club or organization: Not on file     Attends meetings of clubs or organizations: Not on file     Relationship status: Not on file     Intimate partner violence     Fear of current or ex partner: Not on file     Emotionally abused: Not on file     Physically abused: Not on file     Forced sexual activity: Not on file   Other Topics Concern     Parent/sibling w/ CABG, MI or angioplasty before 65F 55M? 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 Self,  ([place]), 2 kids    Has anyone hurt you physically, for example by pushing, hitting, slapping or kicking you or forcing you to have sex? Denies  Do you feel threatened or controlled by a partner, ex-partner or anyone in your life? Denies    Sexual Health     Sexual concerns: No   STI History: Neg  Pregnancy History:   LMP Patient's last menstrual period was 05/15/2021 (exact date).   Last Pap Smear Date:   Lab Results   Component Value Date    PAP NIL 2017    PAP NIL 2013     Abnormal Pap History: None    Recommended Screening     Cholesterol Level (>44 yo or at risk):  Not indicated yet, normal lipids in   Pap/HPV cotest every 5 years for women 30-65   Testing not indicated until     HIV screening:  Testing not indicated (already completed, no change in risk factors)  Breast CA Screening (>39 yo or 10 y before 1st degree relative diagnosis): discussed risks/benefits of screening in 40s, she's decided to hold off based on low risk           Physical Exam:     Vitals: /87   Pulse 85   Temp 98.8  F (37.1  C) (Oral)   Resp 16   Ht 1.664 m (5' 5.5\")   Wt 78.5 kg (173 lb)   LMP 05/15/2021 (Exact Date)   SpO2 97%   Breastfeeding No   BMI 28.35 kg/m    BMI= Body mass index is 28.35 kg/m .   GENERAL: healthy, alert and no distress  EYES: Eyes grossly normal to inspection, extraocular movements - intact, and PERRL  HENT: ear canals- normal; TMs- normal; Nose- normal; Mouth- no ulcers, no lesions  NECK: no tenderness, no adenopathy, no asymmetry, no masses, no stiffness; thyroid- normal to palpation  RESP: lungs clear to auscultation - no rales, no rhonchi, no wheezes  BREAST: no masses, no tenderness, no nipple discharge, no palpable axillary masses or adenopathy  CV: regular " rates and rhythm, normal S1 S2, no S3 or S4 and no murmur, no click or rub -  ABDOMEN: soft, no tenderness, no  hepatosplenomegaly, no masses, normal bowel sounds  MS: extremities- no gross deformities noted, no edema  SKIN: no suspicious lesions, no rashes  NEURO: strength and tone- normal, sensory exam- grossly normal, mentation- intact, speech- normal, reflexes- symmetric  BACK: no CVA tenderness, no paralumbar tenderness  - female: deferred  PSYCH: Alert and oriented times 3; speech- coherent , normal rate and volume; able to articulate logical thoughts, able to abstract reason, no tangential thoughts, no hallucinations or delusions, affect- normal  LYMPHATICS: ant. cervical- normal, post. cervical- normal, axillary- normal      Assessment and Plan      Debbie was seen today for physical.    Diagnoses and all orders for this visit:    Routine general medical examination at a health care facility    Benign essential hypertension  -     Basic metabolic panel  -     Cancel: Albumin Random Urine Quantitative with Creat Ratio  -     CBC with platelets  -     TSH with free T4 reflex  -     Hemoglobin A1c    Screening for condition  -     Hepatitis C antibody      1) Up to date on screening, added Hep C to labs today  2) Discussed healthy diet, exercise to help with ongoing BP control  3) Will discuss treatment options for BP with PharmD, she is right on the border, though does measure at home and have some intermittent elevations  4) Abd pain could be mild GB pain based on story, consider RUQ US if it recurs  5) Happy with IUD for contraception - due for replacement in 2024      Options for treatment and follow-up care were reviewed with the patient . Debbie Hood and/or guardian engaged in the decision making process and verbalized understanding of the options discussed and agreed with the final plan.    Nneka Hylton MD

## 2021-06-02 NOTE — PATIENT INSTRUCTIONS
Preventive Health Recommendations  Female Ages 40 to 49    Yearly exam:     See your health care provider every year in order to  1. Review health changes.   2. Discuss preventive care.    3. Review your medicines if your doctor prescribed any.      Get a Pap test every three years (unless you have an abnormal result and your provider advises testing more often).      If you get Pap tests with HPV test, you only need to test every 5 years, unless you have an abnormal result. You do not need a Pap test if your uterus was removed (hysterectomy) and you have not had cancer.      You should be tested each year for STDs (sexually transmitted diseases), if you're at risk.     Ask your doctor if you should have a mammogram.      Have a colonoscopy (test for colon cancer) if someone in your family has had colon cancer or polyps before age 50.       Have a cholesterol test every 5 years.       Have a diabetes test (fasting glucose) after age 45. If you are at risk for diabetes, you should have this test every 3 years.    Shots: Get a flu shot each year. Get a tetanus shot every 10 years.     Nutrition:     Eat at least 5 servings of fruits and vegetables each day.    Eat whole-grain bread, whole-wheat pasta and brown rice instead of white grains and rice.    Get adequate Calcium and Vitamin D.      Lifestyle    Exercise at least 150 minutes a week (an average of 30 minutes a day, 5 days a week). This will help you control your weight and prevent disease.    Limit alcohol to one drink per day.    No smoking.     Wear sunscreen to prevent skin cancer.    See your dentist every six months for an exam and cleaning.      Dietary Approaches to Stop Hypertension (The DASH Diet)   What is hypertension?   Hypertension is the term for blood pressure that is consistently higher than normal. Blood pressure is the force of blood against artery walls. Blood pressure can be unhealthy if it is above 120/80. The higher your blood pressure,  the greater the health risk.     High blood pressure can be controlled if you take these steps:   Maintain a healthy weight.   Be physically active.   Follow a healthy eating plan, which includes foods lower in salt and sodium.   If you drink alcoholic beverages, do so in moderation.   As noted in this list, diet affects high blood pressure. Following the DASH diet and reducing the amount of sodium in your diet will help lower your blood pressure. It will also help prevent high blood pressure.     What is the DASH diet?   Dietary Approaches to Stop Hypertension (DASH) is a diet that is low in saturated fat, cholesterol, and total fat. It emphasizes fruits, vegetables, and low-fat dairy foods. The DASH diet also includes whole-grain products, fish, poultry, and nuts. It encourages fewer servings of red meat, sweets, and sugar-containing beverages. It is rich in magnesium, potassium, and calcium, as well as protein and fiber.     How do I get started on the DASH diet?   The DASH diet requires no special foods and has no hard-to-follow recipes. Start by seeing how DASH compares with your current eating habits.  The DASH eating plan shown is based on 2,000 calories a day. Your health care provider or a dietitian can help you determine how many calories a day you need. Most adults need somewhere between 1600 and 2800 calories a day. Serving sizes will vary between 1/2 cup and 1 1/4 cups.     Check the product's nutrition label to determine serving sizes of particular products.    Food Group   Number of Servings   Examples of Serving Size    Grains and grain products   7 to 8   1 slice of bread    1 cup ready-to-eat cold cereal    1/2 cup cooked rice, pasta, or   cereal    Vegetables   4 to 5   1 cup raw leafy vegetable    1/2 cup cooked vegetable    6 oz. vegetable juice    Fruits   4 to 5   1 medium fruit       1/4 cup dried fruit    1/2 cup fresh, frozen, or canned fruit    6 oz fruit juice    Low-fat or fat-free dairy  foods   2 to 3   8 oz milk    1 cup yogurt    1 1/2 oz cheese    Lean meats, poultry, or fish   2 or fewer   3 oz cooked lean meat, skinless poultry, or fish    Nuts, seeds, and dry beans   4 to 5 per week   1/3 cup or 1 1/2 oz nuts    1 tablespoon or 1/2 oz seeds    1/2 cup cooked dry beans     Fats and oils   2 to 3   1 teaspoon soft margarine    1 tablespoon low-fat mayonnaise    2 tablespoons light salad dressing    1 teaspoon vegetable oil   Sweets   5 per week   1 tablespoon sugar              8 oz lemonade    1 tablespoon jelly or jam     1/2 oz jelly beans     Make changes gradually. Here are some suggestions that might help:     If you now eat 1 or 2 servings of vegetables a day, add a serving at lunch and another at dinner.   If you don't eat fruit now or have only juice at breakfast, add a serving to your meals or have it as a snack.   Drink milk or water with lunch or dinner instead of soda, sugar-sweetened tea, or alcohol. Choose low-fat (1%) or fat-free (skim) dairy products to reduce how much saturated fat, total fat, cholesterol, and calories you eat. If you have trouble digesting dairy products, try taking lactase enzyme pills or drops (available at drugstores and groceries) with the dairy foods. Or buy lactose-free milk or milk with lactase enzyme added to it.   Read food labels on margarines and salad dressings to choose products lowest in fat.   If you now eat large portions of meat, cut back gradually--by a half or a third at each meal. Limit meat to 6 ounces a day (2 servings). Three to four ounces is about the size of a deck of cards.   Have 2 or more vegetarian-style (meatless) meals each week. Increase servings of vegetables, rice, pasta, and beans in all meals. Try casseroles and pasta, and stir-wells dishes, which have less meat and more vegetables, grains, and beans.   Use fruits canned in their own juice. Fresh fruits require little or no preparation. Dried fruits are a good choice to  carry with you or to have ready in the car.   Try these snacks ideas: unsalted pretzels or nuts mixed with raisins, kareen crackers, low-fat and fat-free yogurt and frozen yogurt, popcorn with no salt or butter added, and raw vegetables.   Choose whole grain foods to get more nutrients, including minerals and fiber. For example, choose whole-wheat bread or whole-grain cereals.   Use fresh, frozen, or no-salt-added canned vegetables.     Remember to also reduce the salt and sodium in your diet. Try to have no more than 2000 milligrams (mg) of sodium per day, with a goal of further reducing the sodium to 1500 mg per day. Three important ways to reduce sodium are:     Use reduced-sodium or no-salt-added food products.   Use less salt when you prepare foods and do not add salt to your food at the table.   Read fool labels. Aim for foods that are less than 5 percent of the daily value of sodium.     The DASH eating plan was not designed for weight loss. But it contains many lower calorie foods, such as fruits and vegetables. You can make it lower in calories by replacing higher calorie foods with more fruits and vegetables. Some ideas to increase fruits and vegetables and decrease calories include:     Eat a medium apple instead of four shortbread cookies. You'll save 80 calories.   Eat 1/4 cup of dried apricots instead of a 2-ounce bag of pork rinds. You'll save 230 calories.   Have a hamburger that's 3 ounces instead of 6 ounces. Add a 1/2 cup serving of carrots and a 1/2 cup serving of spinach. You'll save more than 200 calories.   Instead of 5 ounces of chicken, have a stir wells with 2 ounces of chicken and 1 and 1/2 cups of raw vegetables. Use a small amount of vegetable oil. You'll save 50 calories.   Have a 1/2 cup serving of low-fat frozen yogurt instead of a 1 and 1/2 ounce milk chocolate bar. You'll save about 110 calories.   Use low-fat or fat-free condiments, such as fat free salad dressings.   Eat smaller  portions--cut back gradually.   Use food labels to compare fat content in packaged foods. Items marked low-fat or fat-free may be lower in fat without being lower in calories than their regular versions.   Limit foods with lots of added sugar, such as pies, flavored yogurts, candy bars, ice cream, sherbet, regular soft drinks, and fruit drinks.   Drink water or club soda instead of cola or other soda drinks.     Based on National Institutes of Health Guidelines. Published by [employer].   Copyright   2004 [employer] and/or one of its subsidiaries. All Rights Reserved.

## 2021-06-02 NOTE — Clinical Note
Hi there - I meant to send this to you earlier in the month. 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 Her BPs at home are pretty consistently under 140/90, but her diastolic is in the high 80s most of the time (sometime up to 92). I'm tempted to keep monitoring - but now that she's 46, I was wondering if it was just time to go back on something for BP. Thoughts?

## 2021-06-05 ENCOUNTER — HEALTH MAINTENANCE LETTER (OUTPATIENT)
Age: 46
End: 2021-06-05

## 2021-06-18 ENCOUNTER — MYC MEDICAL ADVICE (OUTPATIENT)
Dept: FAMILY MEDICINE | Facility: CLINIC | Age: 46
End: 2021-06-18

## 2021-07-02 ENCOUNTER — HOSPITAL ENCOUNTER (OUTPATIENT)
Dept: ULTRASOUND IMAGING | Facility: CLINIC | Age: 46
Discharge: HOME | End: 2021-07-02
Attending: FAMILY MEDICINE | Admitting: FAMILY MEDICINE
Payer: COMMERCIAL

## 2021-07-02 DIAGNOSIS — R10.11 RUQ ABDOMINAL PAIN: ICD-10-CM

## 2021-07-02 PROCEDURE — 76700 US EXAM ABDOM COMPLETE: CPT | Mod: 26 | Performed by: RADIOLOGY

## 2021-07-02 PROCEDURE — 76700 US EXAM ABDOM COMPLETE: CPT

## 2021-07-22 ENCOUNTER — TRANSCRIBE ORDERS (OUTPATIENT)
Dept: OTHER | Age: 46
End: 2021-07-22

## 2021-07-22 DIAGNOSIS — R10.11 RUQ ABDOMINAL PAIN: Primary | ICD-10-CM

## 2021-07-22 DIAGNOSIS — K80.20 GALLSTONES: ICD-10-CM

## 2021-07-23 NOTE — TELEPHONE ENCOUNTER
REFERRAL INFORMATION:    Referring Provider:  Dr. Nneka Hylton     Referring Clinic:  [facility]    Reason for Visit/Diagnosis: Gallstones, RUQ Abdominal pain       FUTURE VISIT INFORMATION:    Appointment Date: 7/29/2021    Appointment Time: 4 PM      NOTES RECORD STATUS  DETAILS   OFFICE NOTE from Referring Provider Internal 6/2/2021 Office visit with Dr. Hylton     OFFICE NOTE from Other Specialists N/A    HOSPITAL DISCHARGE SUMMARY/ ED VISITS  N/A    OPERATIVE REPORT N/A    ENDOSCOPY (EGD)  N/A    PERTINENT LABS Internal    PATHOLOGY REPORTS (RELATED) N/A    IMAGING (CT, MRI, US, XR)  Internal US Abdomen: 7/2/2021

## 2021-07-28 ENCOUNTER — PATIENT OUTREACH (OUTPATIENT)
Dept: SURGERY | Facility: CLINIC | Age: 46
End: 2021-07-28

## 2021-07-28 NOTE — PROGRESS NOTES
Pre Visit Call and Assessment    Date of call:  07/28/2021    Phone numbers:  Cell number on file:    Telephone Information:   Mobile [phone]       Reached patient/confirmed appointment:  No - left message:   on voicemail  Patient care team/Primary provider:  Nneka Hylton outpatient pharmacy:    [facility] - [place], [place] - [address]  [address] [facility] [address]  Phone: [phone] Fax: [phone]    [facility] - [place], [place] - [address]  [address] [facility] [address]  Phone: [phone] Fax: [phone]    [facility] - [place], [place] - [address]  [facility] [address]  Phone: [phone] Fax: [phone]    [facility] [place], [place] - [address]  [address]  [facility] [address]  Phone: [phone] Fax: [phone] Alternate Fax: [phone], [phone]    [id number] - [place], [place] - [address]  [address]  [place] [address]  Phone: [phone] Fax: [phone]    Referred to:  Dr. Mikael Guerrero    Reason for visit:  Gallbladder xonsult

## 2021-07-29 ENCOUNTER — PRE VISIT (OUTPATIENT)
Dept: SURGERY | Facility: CLINIC | Age: 46
End: 2021-07-29

## 2021-07-29 ENCOUNTER — PATIENT OUTREACH (OUTPATIENT)
Dept: SURGERY | Facility: CLINIC | Age: 46
End: 2021-07-29

## 2021-07-29 ENCOUNTER — VIRTUAL VISIT (OUTPATIENT)
Dept: SURGERY | Facility: CLINIC | Age: 46
End: 2021-07-29
Attending: FAMILY MEDICINE
Payer: COMMERCIAL

## 2021-07-29 VITALS — HEIGHT: 65 IN | BODY MASS INDEX: 27.49 KG/M2 | WEIGHT: 165 LBS

## 2021-07-29 DIAGNOSIS — K80.20 GALLSTONES: Primary | ICD-10-CM

## 2021-07-29 PROCEDURE — 99203 OFFICE O/P NEW LOW 30 MIN: CPT | Mod: 95 | Performed by: SURGERY

## 2021-07-29 RX ORDER — INDOCYANINE GREEN AND WATER 25 MG
2.5 KIT INJECTION ONCE
Status: CANCELLED | OUTPATIENT
Start: 2021-07-29 | End: 2021-07-29

## 2021-07-29 RX ORDER — CEFAZOLIN SODIUM 2 G/50ML
2 SOLUTION INTRAVENOUS SEE ADMIN INSTRUCTIONS
Status: CANCELLED | OUTPATIENT
Start: 2021-07-29

## 2021-07-29 RX ORDER — CEFAZOLIN SODIUM 2 G/50ML
2 SOLUTION INTRAVENOUS
Status: CANCELLED | OUTPATIENT
Start: 2021-07-29

## 2021-07-29 ASSESSMENT — PAIN SCALES - GENERAL: PAINLEVEL: MILD PAIN (2)

## 2021-07-29 ASSESSMENT — MIFFLIN-ST. JEOR: SCORE: 1394.32

## 2021-07-29 NOTE — LETTER
7/29/2021       RE: Debbie Hood  [address]  [place] [address]     Dear Colleague,    Thank you for referring your patient, Debbie Hood, to the [facility] [place] [facility]. Please see a copy of my visit note below.    Debbie Hood is a 46 year old female with a 2 week history of abdominal pain localized to the right upper quadrant.  Symptoms are associated with fatty food intake.  Associated symptoms: nausea and vomiting  Common duct symptoms:  None  Diet tolerance:  good  Patient was not seen in the Emergency Room for these symptoms.  LFT's:  not addressed    Image studies included:  Ultrasound  Findings:  Gallstones seen    Past medical and surgical history, medications, allergies, family history, and social history were reviewed with the patient. 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    ROS: 10 point review of systems negative except noted in HPI  Impression:  Symptomatic cholelithiasis  Recommendation:  Laparoscopic Cholecystectomy robot assisted.     Low to nonfat diet until the patient undergoes surgery.    A full discussion regarding the alternatives, risks, goals, and potential complications for this surgery was completed today.  The patient understood that the potential problems included but are not limited to:  Infection, bleeding, bile leak, injury to structures about the gallbladder, possible common duct stone requiring further procedures. Most current review of literature confirm the more common specific risks related to laparoscopic cholecystectomy include bile duct injury (3/1000), bile leak (10/1000), retained common bile duct stone (10/1000), postcholecystectomy diarrhea (1-2%) and these complications may require additional treatment.    The patient verbally expressed understanding, was given the opportunity for questions, and gives full informed consent for the  procedure.      Today the patient was instructed on the need for a preoperative H&P, NPO status prior to surgery, and the need to stop anticoagulants prior to surgery.  Additional educational material, soap, and instructions will be mailed out to the patients home.    The total time spent with this patient was 30 minutes.  The total time was spent on the date of encounter doing chart review, history and physical, dressing changes, documentation, patient education, and any further activity as noted above.    Lovenox:  No      Again, thank you for allowing me to participate in the care of your patient.      Sincerely,    Mikael Guerrero MD

## 2021-07-29 NOTE — PATIENT INSTRUCTIONS
You met with Dr. Mikael Guerrero.      Today's visit instructions:    Reminder:  Surgery Requirements  1. Your surgery will be at [facility] [place]- 5th Floor  2. You will need to arrive 1.5-2 hours early based on the location of your surgery.  3. You will need someone to drive you home (over 18 years old) and stay with you for 24 hours after the procedure  4. You will need a preop physical with your regular doctor within 30 days of surgery- closer is always better  5. Stop any blood thinners, vitamins, minerals, or herbal supplements 5 days before surgery.  If you are taking a prescribed blood thinner please let us know for specific instructions  6. Fasting- a nurse from [facility] will call you 1-2 days before surgery to confirm your procedure and tell you when to stop eating and drinking  7. Wash with the soap (Antibacterial, Dial Complete Foam, Hibiclense, or soap given/mailed from the clinic) the night before surgery and morning of surgery. See instructions in the Surgery Packet.  8. If you would like a procedure estimate please call [facility] Financial Counselor at [phone] or [phone].  9. You will need to undergo a COVID-19 test 2-4 days prior to your scheduled procedure.  Our surgery scheduler will help arrange testing.      After your Laparoscopic Robotic Cholecystectomy          Incision care     You may take a shower the day after surgery. Carefully wash your incision with soap and water. Do not submerge yourself in water (bath, whirlpool, hot tub, pool, lake) for 14 days after surgery.     Remove the bandage the day after surgery, but leave the medical tape (Steri-Strips) or glue in place. These will loosen and fall off on their own 1-2 weeks after surgery.       Always wash your hands before touching your incisions or removing bandages.     It is not unusual to form a collection of fluid or blood under your incision that may feel firm or squishy- it can take several  weeks to months for your body to reabsorb it.  At times, it may even drain.  If that should happen keep the area clean with soap, water,  and cover with a clean gauze dressing. You can change this daily or as needed.       Other medicines     Wait to start aspirin or blood thinners until the day after surgery. You can continue your regular medicines at your normal time the day after surgery.      Your pain medicine may cause constipation (hard, dry stools). To help with this, take the stool softener your doctor gave you or an over-the-counter stool softener or laxative. You can stop taking this when you are no longer taking pain medicine and your bowel movements are back to normal.      For pain or discomfort     Take the narcotic pain medicine your doctor gave you as needed and as instructed on the bottle. If you prefer to use over-the-counter medication, use acetaminophen (Tylenol) or ibuprofen (Advil, Motrin) as instructed on the box. Do not take Tylenol if it is in your narcotic pain medication.     Use an ice pack on your abdomen (belly) for 20 minutes at a time as needed for the first 24 hours. Be sure to protect your skin by putting a cloth between the ice pack and your skin.     After 24 hours you can switch to heat for 20 minutes as needed. Be sure to protect your skin by putting a cloth between the heat pack and your skin.       Activities     No driving until you feel it s safe to do so. Don t drive while taking narcotic pain medicine.     Don t lift anything heavier than 20 pounds for 3 to 4 weeks after surgery.      Special equipment     None     Diet     You can eat your regular meals after surgery.      When to call the doctor   Call your doctor if you have:     A fever above 101 F (38.3 C) (taken under the tongue), or a fever or chills lasting more than a day.     Redness at the incision site.     Any fluid or blood draining from the incision, especially if it smells bad.      Severe pain that  doesn t improve with pain medicine.        We will call you 2 to 4 days after surgery to review this handout, answer questions and help arrange after-surgery care. If you have questions or concerns, please call [phone] during regular office hours. If you need to call after business hours, call [phone] and ask to page the surgeon on-call.         Transversus Abdominis Plane (TAP) Pain Block      What is a TAP block?   A TAP block can help you manage your pain after surgery. TAP stands for transversus abdominis plane, which is a muscle layer in your abdomen (belly). The TAP block uses numbing medicine similar to Novocaine to block pain near the site of your surgery.       Why get a TAP block?     To better manage your pain after surgery. A tap block will help keep the pain from getting severe and out of control.     To block pain signals from the nerve, which helps decrease pain after surgery.     To help you sleep, easily breathe deeply, walk and visit with others.      How is it done?   You will lie still on a table. We will use an ultrasound machine to help us see the correct muscle layer of your abdomen. Then, we ll use a needle to inject the medicine. We may also give you some sleep medicine to lessen the pain of the injection.       The procedure takes between 5 and 15 minutes. It is usually done right before surgery, but will sometimes be after. It depends on your surgery and care needs.      What can I expect?     You may feel numbness, tingling or a heaviness in your abdomen.      You may have pain control up to 72 hours after surgery.     The TAP block may not lessen all of your surgery pain. But most patients feel 50 to 75 percent less pain than without the block.       Tell your nurse if you have:     Numbness or tingling in areas other than where the injection was     Blurry vision     Ringing in your ears     A metallic taste in your mouth    If you have questions please contact Kasey Bojorquez RN  RN, or Manuel RN during regular clinic hours, Monday through Friday 7:30 AM - 4:00 PM, or you can contact us via [employer] at anytime.       If you have urgent needs after-hours, weekends, or holidays please call the hospital at [phone] and ask to speak with our on-call General Surgery Team.    Appointment [phone], option #1   Nurse Advice (Kasey Bojorquez, or Manuel): [phone]   Surgery Scheduler (Yaritza): [phone]  Fax: [phone]

## 2021-07-29 NOTE — PROGRESS NOTES
Debbie is a 46 year old who is being evaluated via a billable video visit.      How would you like to obtain your AVS? MyChart  If the video visit is dropped, the invitation should be resent by: Text to cell phone: [phone]  Will anyone else be joining your video visit? No    Debbie Hood is a 46 year old female with a 2 week history of abdominal pain localized to the right upper quadrant.  Symptoms are associated with fatty food intake.  Associated symptoms: nausea and vomiting  Common duct symptoms:  None  Diet tolerance:  good  Patient was not seen in the Emergency Room for these symptoms.  LFT's:  not addressed    Image studies included:  Ultrasound  Findings:  Gallstones seen    Past medical and surgical history, medications, allergies, family history, and social history were reviewed with the patient. 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    ROS: 10 point review of systems negative except noted in HPI  Impression:  Symptomatic cholelithiasis  Recommendation:  Laparoscopic Cholecystectomy robot assisted.     Low to nonfat diet until the patient undergoes surgery.    A full discussion regarding the alternatives, risks, goals, and potential complications for this surgery was completed today.  The patient understood that the potential problems included but are not limited to:  Infection, bleeding, bile leak, injury to structures about the gallbladder, possible common duct stone requiring further procedures. Most current review of literature confirm the more common specific risks related to laparoscopic cholecystectomy include bile duct injury (3/1000), bile leak (10/1000), retained common bile duct stone (10/1000), postcholecystectomy diarrhea (1-2%) and these complications may require additional treatment.    The patient verbally expressed understanding, was given the opportunity for questions, and gives full informed consent for the procedure.      Today the patient was instructed on  the need for a preoperative H&P, NPO status prior to surgery, and the need to stop anticoagulants prior to surgery.  Additional educational material, soap, and instructions will be mailed out to the patients home.    The total time spent with this patient was 30 minutes.  The total time was spent on the date of encounter doing chart review, history and physical, dressing changes, documentation, patient education, and any further activity as noted above.    Lovenox:  No        Distant Location (provider location):  [facility] [place]     Platform used for Video Visit: Crispy Gamer

## 2021-07-29 NOTE — PROGRESS NOTES
You met with Dr. Mikael Guerrero.      Today's visit instructions:    Reminder:  Surgery Requirements  1. Your surgery will be at [facility] [place]- 5th Floor  2. You will need to arrive 1.5-2 hours early based on the location of your surgery.  3. You will need someone to drive you home (over 18 years old) and stay with you for 24 hours after the procedure  4. You will need a preop physical with your regular doctor within 30 days of surgery- closer is always better  5. Stop any blood thinners, vitamins, minerals, or herbal supplements 5 days before surgery.  If you are taking a prescribed blood thinner please let us know for specific instructions  6. Fasting- a nurse from [facility] will call you 1-2 days before surgery to confirm your procedure and tell you when to stop eating and drinking  7. Wash with the soap (Antibacterial, Dial Complete Foam, Hibiclense, or soap given/mailed from the clinic) the night before surgery and morning of surgery. See instructions in the Surgery Packet.  8. If you would like a procedure estimate please call [facility] Financial Counselor at [phone] or [phone].  9. You will need to undergo a COVID-19 test 2-4 days prior to your scheduled procedure.  Our surgery scheduler will help arrange testing.      After your Laparoscopic Robotic Cholecystectomy          Incision care     You may take a shower the day after surgery. Carefully wash your incision with soap and water. Do not submerge yourself in water (bath, whirlpool, hot tub, pool, lake) for 14 days after surgery.     Remove the bandage the day after surgery, but leave the medical tape (Steri-Strips) or glue in place. These will loosen and fall off on their own 1-2 weeks after surgery.       Always wash your hands before touching your incisions or removing bandages.     It is not unusual to form a collection of fluid or blood under your incision that may feel firm or squishy- it can take several  weeks to months for your body to reabsorb it.  At times, it may even drain.  If that should happen keep the area clean with soap, water,  and cover with a clean gauze dressing. You can change this daily or as needed.       Other medicines     Wait to start aspirin or blood thinners until the day after surgery. You can continue your regular medicines at your normal time the day after surgery.      Your pain medicine may cause constipation (hard, dry stools). To help with this, take the stool softener your doctor gave you or an over-the-counter stool softener or laxative. You can stop taking this when you are no longer taking pain medicine and your bowel movements are back to normal.      For pain or discomfort     Take the narcotic pain medicine your doctor gave you as needed and as instructed on the bottle. If you prefer to use over-the-counter medication, use acetaminophen (Tylenol) or ibuprofen (Advil, Motrin) as instructed on the box. Do not take Tylenol if it is in your narcotic pain medication.     Use an ice pack on your abdomen (belly) for 20 minutes at a time as needed for the first 24 hours. Be sure to protect your skin by putting a cloth between the ice pack and your skin.     After 24 hours you can switch to heat for 20 minutes as needed. Be sure to protect your skin by putting a cloth between the heat pack and your skin.       Activities     No driving until you feel it s safe to do so. Don t drive while taking narcotic pain medicine.     Don t lift anything heavier than 20 pounds for 3 to 4 weeks after surgery.      Diet     You can eat your regular meals after surgery.      When to call the doctor   Call your doctor if you have:     A fever above 101 F (38.3 C) (taken under the tongue), or a fever or chills lasting more than a day.     Redness at the incision site.     Any fluid or blood draining from the incision, especially if it smells bad.      Severe pain that doesn t improve with pain medicine.         We will call you 2 to 4 days after surgery to review this handout, answer questions and help arrange after-surgery care. If you have questions or concerns, please call [phone] during regular office hours. If you need to call after business hours, call [phone] and ask to page the surgeon on-call.         Transversus Abdominis Plane (TAP) Pain Block      What is a TAP block?   A TAP block can help you manage your pain after surgery. TAP stands for transversus abdominis plane, which is a muscle layer in your abdomen (belly). The TAP block uses numbing medicine similar to Novocaine to block pain near the site of your surgery.       Why get a TAP block?     To better manage your pain after surgery. A tap block will help keep the pain from getting severe and out of control.     To block pain signals from the nerve, which helps decrease pain after surgery.     To help you sleep, easily breathe deeply, walk and visit with others.      How is it done?   You will lie still on a table. We will use an ultrasound machine to help us see the correct muscle layer of your abdomen. Then, we ll use a needle to inject the medicine. We may also give you some sleep medicine to lessen the pain of the injection.       The procedure takes between 5 and 15 minutes. It is usually done right before surgery, but will sometimes be after. It depends on your surgery and care needs.      What can I expect?     You may feel numbness, tingling or a heaviness in your abdomen.      You may have pain control up to 72 hours after surgery.     The TAP block may not lessen all of your surgery pain. But most patients feel 50 to 75 percent less pain than without the block.       Tell your nurse if you have:     Numbness or tingling in areas other than where the injection was     Blurry vision     Ringing in your ears     A metallic taste in your mouth    If you have questions please contact Maryellen RN, Kasey GARCIA, or Manuel GARCIA during regular  clinic hours, Monday through Friday 7:30 AM - 4:00 PM, or you can contact us via [employer] at anytime.       If you have urgent needs after-hours, weekends, or holidays please call the hospital at [phone] and ask to speak with our on-call General Surgery Team.    Appointment [phone], option #1   Nurse Advice (Kasey Bojorquez or Andrea): [phone]   Surgery Scheduler (Yaritza): [phone]  Fax: [phone]

## 2021-07-30 ENCOUNTER — TELEPHONE (OUTPATIENT)
Dept: SURGERY | Facility: CLINIC | Age: 46
End: 2021-07-30

## 2021-07-30 NOTE — PROGRESS NOTES
Pre and Post op Patient Education/Teaching Flowsheet  Relevant Diagnosis:  Gallbladder consult  Teaching Topic:  Pre and post op teaching  Person(s) Involved in teaching:  Patient     Motivation Level:  Asks Questions:  Yes  Eager to Learn:  Yes  Cooperative:  Yes  Receptive (willing/able to accept information):  Yes  Any cultural factors/[employer] beliefs that may influence understanding or compliance?  No    Patient/caregiver/family demonstrates understanding of the following:  Reason for the appointment, diagnosis, and treatment plan:  Yes  Patient demonstrates understanding of the following:  Pre-op bowel prep:  No  Post-op pain management recommendations (medications, ice compress, binder/athletic supporter (if applicable), etc. Yes  Inguinal hernia patients:  Post-op urinary retention- discussed signs/symptoms and visit to ER for Martin catheter placement and to stay in place for at least 48 hours:  NA  Restrictions:  Yes  Medications to take the day of surgery:  Per PCP  Blood thinner medications discussed and when to stop (if applicable):  Yes  Wound care:  Yes  Diabetes medication management (if applicable):  Per PCP  Which situations necessitate calling provider and whom to contact:  Discussed how to contact the hospital, nurse, and clinic scheduling staff if necessary      Date and time of surgery:  Yes  Location of surgery: [facility] [place]- 5th Floor  History and Physical and any other testing necessary prior to surgery:  Yes  Required time line for completion of History and Physical and any pre-op testing:  Yes  Discuss need for someone to drive patient home and stay with them for 24 hours:  Yes  Pre-op showering/scrub information with Surgical Scrub:  Yes  NPO Guidelines:  NPO per Anesthesia Guidelines  COVID-19 Testing:  Yes    Infection Prevention: Patient demonstrates understanding of the following:  Patient instructed on hand hygiene:  Yes  Surgical procedure site care  will be taught and will be reviewed at the time of discharge  Signs and symptoms of infection taught:  Yes  Wound care reviewed and will be taught at the time of discharge  Central venous catheter care will be taught at the time of discharge (if applicable)    Post-op follow-up:  Instructional materials used/given/mailed:  [facility] Booklet, post op teaching sheet, Map, Soap, and arrival/location information    Surgical instructions mailed to patient

## 2021-07-30 NOTE — TELEPHONE ENCOUNTER
Called patient to schedule procedure with Dr. Guerrero, there was no answer.  Unable to leave a message. Sent patient a Planana message. Yaritza MORALES Pam-Op Coordinator for General and Urology Surgery

## 2021-08-02 DIAGNOSIS — Z11.59 ENCOUNTER FOR SCREENING FOR OTHER VIRAL DISEASES: ICD-10-CM

## 2021-08-02 PROBLEM — K80.20 GALLSTONES: Status: ACTIVE | Noted: 2021-08-02

## 2021-08-02 NOTE — TELEPHONE ENCOUNTER
Patient is scheduled for surgery with Dr. Guerrero    Spoke with: Patient     Date of Surgery: Monday 08/16/21     Location: ASC    Informed patient they will need an adult  Yes    Pre op with Provider tim    H&P: Scheduled with Patient will call and schedule pre-op with her PCP Nneka Hylton at [facility]     Pre-procedure COVID-19 Test: Thursday 08/12/21 @ 10:00am     Additional imaging/appointments: tim    Surgery packet: sent to patient via Turn per patient request 08/02/21      Additional comments: tim

## 2021-08-06 ENCOUNTER — OFFICE VISIT (OUTPATIENT)
Dept: FAMILY MEDICINE | Facility: CLINIC | Age: 46
End: 2021-08-06
Payer: COMMERCIAL

## 2021-08-06 VITALS
SYSTOLIC BLOOD PRESSURE: 125 MMHG | BODY MASS INDEX: 27.29 KG/M2 | DIASTOLIC BLOOD PRESSURE: 84 MMHG | WEIGHT: 164 LBS | HEART RATE: 86 BPM | OXYGEN SATURATION: 100 % | RESPIRATION RATE: 16 BRPM | TEMPERATURE: 98.5 F

## 2021-08-06 DIAGNOSIS — Z01.818 PRE-OP EXAM: Primary | ICD-10-CM

## 2021-08-06 PROCEDURE — 99214 OFFICE O/P EST MOD 30 MIN: CPT | Performed by: FAMILY MEDICINE

## 2021-08-06 NOTE — H&P (VIEW-ONLY)
[address]  [address] [place] [id number]  Phone: [phone]  Fax: [phone]  Primary Provider: Nneka Hylton  Pre-op Performing Provider: SKYLER TEAGUE    PREOPERATIVE EVALUATION:  Today's date: 8/6/2021    Debbie Hood is a 46 year old female who presents for a preoperative evaluation.    Surgical Information:  Surgery/Procedure: Gallbladder Removal  Surgery Location: [address] location  Surgeon: Dr Guerrero  Surgery Date: 08/16/2021  Time of Surgery: unknown  Where patient plans to recover: At home with family  Fax number for surgical facility: through [facility]    Type of Anesthesia Anticipated: General    Assessment & Plan     The proposed surgical procedure is considered INTERMEDIATE risk.    Problem List Items Addressed This Visit     None      Visit Diagnoses     Pre-op exam    -  Primary        Risks and Recommendations:  The patient has the following additional risks and recommendations for perioperative complications:   - No identified additional risk factors other than previously addressed    RECOMMENDATION:  APPROVAL GIVEN to proceed with proposed procedure, without further diagnostic evaluation.    Subjective     HPI related to upcoming procedure: Abdominal pain last couples weeks due to gallstones found on ultrasound. Scheduled to have cholecystectomy.    Preop Questions 8/4/2021   1. Have you ever had a heart attack or stroke? No   2. Have you ever had surgery on your heart or blood vessels, such as a stent placement, a coronary artery bypass, or surgery on an artery in your head, neck, heart, or legs? No   3. Do you have chest pain with activity? No   4. Do you have a history of  heart failure? No   5. Do you currently have a cold, bronchitis or symptoms of other infection? No   6. Do you have a cough, shortness of breath, or wheezing? No   7. Do you or anyone in your family have previous history of blood clots? No   8. Do  "you or does anyone in your family have a serious bleeding problem such as prolonged bleeding following surgeries or cuts? No   9. Have you ever had problems with anemia or been told to take iron pills? No   10. Have you had any abnormal blood loss such as black, tarry or bloody stools, or abnormal vaginal bleeding? No   11. Have you ever had a blood transfusion? No   12. Are you willing to have a blood transfusion if it is medically needed before, during, or after your surgery? Yes   13. Have you or any of your relatives ever had problems with anesthesia? YES - grandmother (mother's mother) has been \"hard to get under\" a couple times; patient has had anesthesia before without complications   14. Do you have sleep apnea, excessive snoring or daytime drowsiness? No   15. Do you have any artifical heart valves or other implanted medical devices like a pacemaker, defibrillator, or continuous glucose monitor? No   16. Do you have artificial joints? No   17. Are you allergic to latex? No   18. Is there any chance that you may be pregnant? No       Health Care Directive:  Patient does not have a Health Care Directive or Living Will: Discussed advance care planning with patient; information given to patient to review.    Preoperative Review of :   reviewed - no record of controlled substances prescribed.       Status of Chronic Conditions:  See problem list for active medical problems.  Problems all longstanding and stable, except as noted/documented.  See ROS for pertinent symptoms related to these conditions.    Benign essential hypertension - not at the point of requiring medication, today blood pressure 125/84 and home blood pressure readings usually around 130/85    Review of Systems     - low fat diet -> lost some weight intentionally  - some nausea with vomiting and abdominal pain for gall bladder    CONSTITUTIONAL: NEGATIVE for fever, chills  INTEGUMENTARY/SKIN: NEGATIVE for worrisome rashes, moles or " lesions  EYES: NEGATIVE for vision changes or irritation  ENT/MOUTH: NEGATIVE for ear, mouth and throat problems  RESP: NEGATIVE for significant cough or SOB  CV: NEGATIVE for chest pain, palpitations or peripheral edema  GI: POSITIVE for nausea, vomiting, abdominal pain 2/2 gallstones  : NEGATIVE for frequency, dysuria, or hematuria  MUSCULOSKELETAL: NEGATIVE for significant arthralgias or myalgia  NEURO: NEGATIVE for weakness, dizziness or paresthesias  ENDOCRINE: NEGATIVE for temperature intolerance, skin/hair changes  HEME: NEGATIVE for bleeding problems  PSYCHIATRIC: NEGATIVE for changes in mood or affect    Patient Active Problem List    Diagnosis Date Noted     Gallstones 08/02/2021     Priority: Medium     Added automatically from request for surgery [id number]       Sebaceous cyst 11/04/2019     Priority: Medium     IUD (intrauterine device) in place 02/06/2017     Priority: Medium     [Mirena, Lot#[id number] - due for removal/replacement in 2024. This IUD was removed since it was pushed out]    Mirena, Lot#[id number] - due for removal/replacment in 2024 (placed May 23, 2017).        Elevated blood pressure affecting pregnancy in third trimester, antepartum 12/28/2015     Priority: Medium     Weekly preeclampsia labs, +/- weekly NST/BPP.        Seasonal allergic rhinitis 01/20/2015     Priority: Medium     Benign essential hypertension 10/15/2013     Priority: Medium     Average <140/90; managing with diet, exercise       Abnormal Pap smear and cervical HPV (human papillomavirus) 12/14/2012     Priority: Medium     *h/o colposcopy, no cryo - before coming to [facility]  3/20/07: NIL  4/29/08: NIL, +HR-HPV  6/9/2009: NIL, +HR-HPV  8/30/2010: NIL, neg HPV  12/2011: NIL  2013: NIL, neg HPV  2017: NIL, neg HPV  Return to routine screening          Past Medical History:   Diagnosis Date     Hypertension     borderline high BP few years back while on OCP, now normal.     Hypertension in pregnancy, pre-existing 2/26/2013     Mild diastolic HTN - documented prior to pregnancy.  Baseline labs checked and normal.  Baseline 24-hr urine protein = 0.07g/24hr.      Mastitis, acute 10/2013     Preeclampsia complicating hypertension 10/2013    IOL at term     Skin cancer of eyelid, left 2020     Past Surgical History:   Procedure Laterality Date     HC CLOSED TX ELBOW DISLOCATION W/O ANESTHESIA       MOHS MICROGRAPHIC PROCEDURE Left 2020    basal cell carcinoma of left lower eyelid     TONSILLECTOMY & ADENOIDECTOMY       Current Outpatient Medications   Medication Sig Dispense Refill     acetaminophen (TYLENOL) 325 MG tablet Take 325-650 mg by mouth every 6 hours as needed for mild pain       levonorgestrel (MIRENA) 20 MCG/24HR IUD 1 each (20 mcg) by Intrauterine route continuous       multivitamin w/minerals (THERA-VIT-M) tablet Take 1 tablet by mouth daily       order for DME Equipment being ordered: BP cuff. Measure daily and if 160/110 go to hospital. 1 Units 0     triamcinolone (NASACORT) 55 MCG/ACT Inhaler Spray 2 sprays into both nostrils daily       Allergies   Allergen Reactions     Kiwi       Social History     Tobacco Use     Smoking status: Former Smoker     Packs/day: 0.00     Types: Cigarettes     Quit date: 2008     Years since quittin.9     Smokeless tobacco: Never Used     Tobacco comment: 5 cigs a day   Substance Use Topics     Alcohol use: Yes     Alcohol/week: 0.0 standard drinks     Comment: 2 drinks a week     Family History   Problem Relation Age of Onset     Hypertension Father      Breast Cancer Paternal Grandmother      Thyroid Disease Maternal Grandmother      Depression Sister      Melanoma No family hx of      History   Drug Use No      Ibuprofen to manage gall bladder pain (past month around 2 pills in afternoon and 2 pills at night)    Objective     /84   Pulse 86   Temp 98.5  F (36.9  C) (Oral)   Resp 16   Wt 74.4 kg (164 lb)   LMP 2021 (Exact Date)   SpO2 100%   BMI  27.29 kg/m      Physical Exam    GENERAL APPEARANCE: healthy, alert and no distress     EYES: EOMI, PERRL     HENT: ear canals and TM's normal and nose and mouth without ulcers or lesions     NECK: no adenopathy, no asymmetry, masses, or scars and thyroid normal to palpation     RESP: lungs clear to auscultation - no rales, rhonchi or wheezes     CV: regular rates and rhythm, normal S1 S2, no S3 or S4 and no murmur, click or rub     ABDOMEN:  soft, nontender, no HSM or masses and bowel sounds normal     MS: extremities normal- no gross deformities noted, no evidence of inflammation in joints, FROM in all extremities.     SKIN: no suspicious lesions or rashes     NEURO: Normal strength and tone, sensory exam grossly normal, mentation intact and speech normal     PSYCH: mentation appears normal. and affect normal/bright     LYMPHATICS: No cervical adenopathy    Recent Labs   Lab Test 06/02/21  1259 12/13/19  1106 12/13/19  0841   HGB 14.3 14.2  --     262  --      --  135.6   POTASSIUM 4.7  --  3.7   CR 0.68  --  0.6   A1C 5.4  --  5.1        Diagnostics:  No labs were ordered during this visit.   No EKG required, no history of coronary heart disease, significant arrhythmia, peripheral arterial disease or other structural heart disease.    Revised Cardiac Risk Index (RCRI):  The patient has the following serious cardiovascular risks for perioperative complications:   - No serious cardiac risks = 0 points     RCRI Interpretation: 0 points: Class I (very low risk - 0.4% complication rate)     Signed Electronically by: Nancy Dove DO  Copy of this evaluation report is provided to requesting physician.

## 2021-08-06 NOTE — PROGRESS NOTES
[address]  [address] [place] [address]  Phone: [phone]  Fax: [phone]  Primary Provider: Nneka Hylton  Pre-op Performing Provider: SKYLER TEAGUE    PREOPERATIVE EVALUATION:  Today's date: 8/6/2021    Debbie Hood is a 46 year old female who presents for a preoperative evaluation.    Surgical Information:  Surgery/Procedure: Gallbladder Removal  Surgery Location: [address] location  Surgeon: Dr Guerrero  Surgery Date: 08/16/2021  Time of Surgery: unknown  Where patient plans to recover: At home with family  Fax number for surgical facility: through [facility]    Type of Anesthesia Anticipated: General    Assessment & Plan     The proposed surgical procedure is considered INTERMEDIATE risk.    Problem List Items Addressed This Visit     None      Visit Diagnoses     Pre-op exam    -  Primary        Risks and Recommendations:  The patient has the following additional risks and recommendations for perioperative complications:   - No identified additional risk factors other than previously addressed    RECOMMENDATION:  APPROVAL GIVEN to proceed with proposed procedure, without further diagnostic evaluation.    Subjective     HPI related to upcoming procedure: Abdominal pain last couples weeks due to gallstones found on ultrasound. Scheduled to have cholecystectomy.    Preop Questions 8/4/2021   1. Have you ever had a heart attack or stroke? No   2. Have you ever had surgery on your heart or blood vessels, such as a stent placement, a coronary artery bypass, or surgery on an artery in your head, neck, heart, or legs? No   3. Do you have chest pain with activity? No   4. Do you have a history of  heart failure? No   5. Do you currently have a cold, bronchitis or symptoms of other infection? No   6. Do you have a cough, shortness of breath, or wheezing? No   7. Do you or anyone in your family have previous history of blood clots? No   8. Do  "you or does anyone in your family have a serious bleeding problem such as prolonged bleeding following surgeries or cuts? No   9. Have you ever had problems with anemia or been told to take iron pills? No   10. Have you had any abnormal blood loss such as black, tarry or bloody stools, or abnormal vaginal bleeding? No   11. Have you ever had a blood transfusion? No   12. Are you willing to have a blood transfusion if it is medically needed before, during, or after your surgery? Yes   13. Have you or any of your relatives ever had problems with anesthesia? YES - grandmother (mother's mother) has been \"hard to get under\" a couple times; patient has had anesthesia before without complications   14. Do you have sleep apnea, excessive snoring or daytime drowsiness? No   15. Do you have any artifical heart valves or other implanted medical devices like a pacemaker, defibrillator, or continuous glucose monitor? No   16. Do you have artificial joints? No   17. Are you allergic to latex? No   18. Is there any chance that you may be pregnant? No       Health Care Directive:  Patient does not have a Health Care Directive or Living Will: Discussed advance care planning with patient; information given to patient to review.    Preoperative Review of :   reviewed - no record of controlled substances prescribed.       Status of Chronic Conditions:  See problem list for active medical problems.  Problems all longstanding and stable, except as noted/documented.  See ROS for pertinent symptoms related to these conditions.    Benign essential hypertension - not at the point of requiring medication, today blood pressure 125/84 and home blood pressure readings usually around 130/85    Review of Systems     - low fat diet -> lost some weight intentionally  - some nausea with vomiting and abdominal pain for gall bladder    CONSTITUTIONAL: NEGATIVE for fever, chills  INTEGUMENTARY/SKIN: NEGATIVE for worrisome rashes, moles or " lesions  EYES: NEGATIVE for vision changes or irritation  ENT/MOUTH: NEGATIVE for ear, mouth and throat problems  RESP: NEGATIVE for significant cough or SOB  CV: NEGATIVE for chest pain, palpitations or peripheral edema  GI: POSITIVE for nausea, vomiting, abdominal pain 2/2 gallstones  : NEGATIVE for frequency, dysuria, or hematuria  MUSCULOSKELETAL: NEGATIVE for significant arthralgias or myalgia  NEURO: NEGATIVE for weakness, dizziness or paresthesias  ENDOCRINE: NEGATIVE for temperature intolerance, skin/hair changes  HEME: NEGATIVE for bleeding problems  PSYCHIATRIC: NEGATIVE for changes in mood or affect    Patient Active Problem List    Diagnosis Date Noted     Gallstones 08/02/2021     Priority: Medium     Added automatically from request for surgery [id number]       Sebaceous cyst 11/04/2019     Priority: Medium     IUD (intrauterine device) in place 02/06/2017     Priority: Medium     [Mirena, Lot#[id number] - due for removal/replacement in 2024. This IUD was removed since it was pushed out]    Mirena, Lot#[id number] - due for removal/replacment in 2024 (placed May 23, 2017).        Elevated blood pressure affecting pregnancy in third trimester, antepartum 12/28/2015     Priority: Medium     Weekly preeclampsia labs, +/- weekly NST/BPP.        Seasonal allergic rhinitis 01/20/2015     Priority: Medium     Benign essential hypertension 10/15/2013     Priority: Medium     Average <140/90; managing with diet, exercise       Abnormal Pap smear and cervical HPV (human papillomavirus) 12/14/2012     Priority: Medium     *h/o colposcopy, no cryo - before coming to [facility]  3/20/07: NIL  4/29/08: NIL, +HR-HPV  6/9/2009: NIL, +HR-HPV  8/30/2010: NIL, neg HPV  12/2011: NIL  2013: NIL, neg HPV  2017: NIL, neg HPV  Return to routine screening          Past Medical History:   Diagnosis Date     Hypertension     borderline high BP few years back while on OCP, now normal.     Hypertension in pregnancy, pre-existing 2/26/2013     Mild diastolic HTN - documented prior to pregnancy.  Baseline labs checked and normal.  Baseline 24-hr urine protein = 0.07g/24hr.      Mastitis, acute 10/2013     Preeclampsia complicating hypertension 10/2013    IOL at term     Skin cancer of eyelid, left 2020     Past Surgical History:   Procedure Laterality Date     HC CLOSED TX ELBOW DISLOCATION W/O ANESTHESIA       MOHS MICROGRAPHIC PROCEDURE Left 2020    basal cell carcinoma of left lower eyelid     TONSILLECTOMY & ADENOIDECTOMY       Current Outpatient Medications   Medication Sig Dispense Refill     acetaminophen (TYLENOL) 325 MG tablet Take 325-650 mg by mouth every 6 hours as needed for mild pain       levonorgestrel (MIRENA) 20 MCG/24HR IUD 1 each (20 mcg) by Intrauterine route continuous       multivitamin w/minerals (THERA-VIT-M) tablet Take 1 tablet by mouth daily       order for DME Equipment being ordered: BP cuff. Measure daily and if 160/110 go to hospital. 1 Units 0     triamcinolone (NASACORT) 55 MCG/ACT Inhaler Spray 2 sprays into both nostrils daily       Allergies   Allergen Reactions     Kiwi       Social History     Tobacco Use     Smoking status: Former Smoker     Packs/day: 0.00     Types: Cigarettes     Quit date: 2008     Years since quittin.9     Smokeless tobacco: Never Used     Tobacco comment: 5 cigs a day   Substance Use Topics     Alcohol use: Yes     Alcohol/week: 0.0 standard drinks     Comment: 2 drinks a week     Family History   Problem Relation Age of Onset     Hypertension Father      Breast Cancer Paternal Grandmother      Thyroid Disease Maternal Grandmother      Depression Sister      Melanoma No family hx of      History   Drug Use No      Ibuprofen to manage gall bladder pain (past month around 2 pills in afternoon and 2 pills at night)    Objective     /84   Pulse 86   Temp 98.5  F (36.9  C) (Oral)   Resp 16   Wt 74.4 kg (164 lb)   LMP 2021 (Exact Date)   SpO2 100%   BMI  27.29 kg/m      Physical Exam    GENERAL APPEARANCE: healthy, alert and no distress     EYES: EOMI, PERRL     HENT: ear canals and TM's normal and nose and mouth without ulcers or lesions     NECK: no adenopathy, no asymmetry, masses, or scars and thyroid normal to palpation     RESP: lungs clear to auscultation - no rales, rhonchi or wheezes     CV: regular rates and rhythm, normal S1 S2, no S3 or S4 and no murmur, click or rub     ABDOMEN:  soft, nontender, no HSM or masses and bowel sounds normal     MS: extremities normal- no gross deformities noted, no evidence of inflammation in joints, FROM in all extremities.     SKIN: no suspicious lesions or rashes     NEURO: Normal strength and tone, sensory exam grossly normal, mentation intact and speech normal     PSYCH: mentation appears normal. and affect normal/bright     LYMPHATICS: No cervical adenopathy    Recent Labs   Lab Test 06/02/21  1259 12/13/19  1106 12/13/19  0841   HGB 14.3 14.2  --     262  --      --  135.6   POTASSIUM 4.7  --  3.7   CR 0.68  --  0.6   A1C 5.4  --  5.1        Diagnostics:  No labs were ordered during this visit.   No EKG required, no history of coronary heart disease, significant arrhythmia, peripheral arterial disease or other structural heart disease.    Revised Cardiac Risk Index (RCRI):  The patient has the following serious cardiovascular risks for perioperative complications:   - No serious cardiac risks = 0 points     RCRI Interpretation: 0 points: Class I (very low risk - 0.4% complication rate)     Signed Electronically by: Nancy Dove DO  Copy of this evaluation report is provided to requesting physician.

## 2021-08-12 ENCOUNTER — LAB (OUTPATIENT)
Dept: LAB | Facility: CLINIC | Age: 46
End: 2021-08-12
Payer: COMMERCIAL

## 2021-08-12 DIAGNOSIS — Z11.59 ENCOUNTER FOR SCREENING FOR OTHER VIRAL DISEASES: ICD-10-CM

## 2021-08-12 LAB — SARS-COV-2 RNA RESP QL NAA+PROBE: NEGATIVE

## 2021-08-12 PROCEDURE — U0005 INFEC AGEN DETEC AMPLI PROBE: HCPCS

## 2021-08-12 PROCEDURE — U0003 INFECTIOUS AGENT DETECTION BY NUCLEIC ACID (DNA OR RNA); SEVERE ACUTE RESPIRATORY SYNDROME CORONAVIRUS 2 (SARS-COV-2) (CORONAVIRUS DISEASE [COVID-19]), AMPLIFIED PROBE TECHNIQUE, MAKING USE OF HIGH THROUGHPUT TECHNOLOGIES AS DESCRIBED BY CMS-2020-01-R: HCPCS

## 2021-08-13 ENCOUNTER — ANESTHESIA EVENT (OUTPATIENT)
Dept: SURGERY | Facility: AMBULATORY SURGERY CENTER | Age: 46
End: 2021-08-13
Payer: COMMERCIAL

## 2021-08-16 ENCOUNTER — ANESTHESIA (OUTPATIENT)
Dept: SURGERY | Facility: AMBULATORY SURGERY CENTER | Age: 46
End: 2021-08-16
Payer: COMMERCIAL

## 2021-08-16 ENCOUNTER — HOSPITAL ENCOUNTER (OUTPATIENT)
Facility: AMBULATORY SURGERY CENTER | Age: 46
End: 2021-08-16
Attending: SURGERY
Payer: COMMERCIAL

## 2021-08-16 VITALS
DIASTOLIC BLOOD PRESSURE: 72 MMHG | OXYGEN SATURATION: 98 % | RESPIRATION RATE: 16 BRPM | BODY MASS INDEX: 26.99 KG/M2 | WEIGHT: 162 LBS | TEMPERATURE: 96.5 F | HEART RATE: 63 BPM | HEIGHT: 65 IN | SYSTOLIC BLOOD PRESSURE: 107 MMHG

## 2021-08-16 DIAGNOSIS — K80.20 GALLSTONES: ICD-10-CM

## 2021-08-16 LAB
HCG UR QL: NEGATIVE
INTERNAL QC OK POCT: NORMAL

## 2021-08-16 PROCEDURE — 47562 LAPAROSCOPIC CHOLECYSTECTOMY: CPT

## 2021-08-16 PROCEDURE — 47562 LAPAROSCOPIC CHOLECYSTECTOMY: CPT | Performed by: SURGERY

## 2021-08-16 PROCEDURE — 88304 TISSUE EXAM BY PATHOLOGIST: CPT | Performed by: PATHOLOGY

## 2021-08-16 PROCEDURE — 81025 URINE PREGNANCY TEST: CPT | Performed by: PATHOLOGY

## 2021-08-16 RX ORDER — ONDANSETRON 2 MG/ML
INJECTION INTRAMUSCULAR; INTRAVENOUS PRN
Status: DISCONTINUED | OUTPATIENT
Start: 2021-08-16 | End: 2021-08-16

## 2021-08-16 RX ORDER — SODIUM CHLORIDE, SODIUM LACTATE, POTASSIUM CHLORIDE, CALCIUM CHLORIDE 600; 310; 30; 20 MG/100ML; MG/100ML; MG/100ML; MG/100ML
INJECTION, SOLUTION INTRAVENOUS CONTINUOUS
Status: DISCONTINUED | OUTPATIENT
Start: 2021-08-16 | End: 2021-08-17 | Stop reason: HOSPADM

## 2021-08-16 RX ORDER — NALOXONE HYDROCHLORIDE 0.4 MG/ML
0.4 INJECTION, SOLUTION INTRAMUSCULAR; INTRAVENOUS; SUBCUTANEOUS
Status: DISCONTINUED | OUTPATIENT
Start: 2021-08-16 | End: 2021-08-17 | Stop reason: HOSPADM

## 2021-08-16 RX ORDER — PROPOFOL 10 MG/ML
INJECTION, EMULSION INTRAVENOUS CONTINUOUS PRN
Status: DISCONTINUED | OUTPATIENT
Start: 2021-08-16 | End: 2021-08-16

## 2021-08-16 RX ORDER — PROPOFOL 10 MG/ML
INJECTION, EMULSION INTRAVENOUS PRN
Status: DISCONTINUED | OUTPATIENT
Start: 2021-08-16 | End: 2021-08-16

## 2021-08-16 RX ORDER — FENTANYL CITRATE 50 UG/ML
25-50 INJECTION, SOLUTION INTRAMUSCULAR; INTRAVENOUS
Status: DISCONTINUED | OUTPATIENT
Start: 2021-08-16 | End: 2021-08-16 | Stop reason: HOSPADM

## 2021-08-16 RX ORDER — LIDOCAINE 40 MG/G
CREAM TOPICAL
Status: DISCONTINUED | OUTPATIENT
Start: 2021-08-16 | End: 2021-08-16 | Stop reason: HOSPADM

## 2021-08-16 RX ORDER — CEFAZOLIN SODIUM 2 G/50ML
2 SOLUTION INTRAVENOUS
Status: DISCONTINUED | OUTPATIENT
Start: 2021-08-16 | End: 2021-08-16 | Stop reason: HOSPADM

## 2021-08-16 RX ORDER — SODIUM CHLORIDE, SODIUM LACTATE, POTASSIUM CHLORIDE, CALCIUM CHLORIDE 600; 310; 30; 20 MG/100ML; MG/100ML; MG/100ML; MG/100ML
INJECTION, SOLUTION INTRAVENOUS CONTINUOUS
Status: DISCONTINUED | OUTPATIENT
Start: 2021-08-16 | End: 2021-08-16 | Stop reason: HOSPADM

## 2021-08-16 RX ORDER — ACETAMINOPHEN 325 MG/1
975 TABLET ORAL ONCE
Status: COMPLETED | OUTPATIENT
Start: 2021-08-16 | End: 2021-08-16

## 2021-08-16 RX ORDER — DEXAMETHASONE SODIUM PHOSPHATE 4 MG/ML
INJECTION, SOLUTION INTRA-ARTICULAR; INTRALESIONAL; INTRAMUSCULAR; INTRAVENOUS; SOFT TISSUE PRN
Status: DISCONTINUED | OUTPATIENT
Start: 2021-08-16 | End: 2021-08-16

## 2021-08-16 RX ORDER — HYDROCODONE BITARTRATE AND ACETAMINOPHEN 5; 325 MG/1; MG/1
1-2 TABLET ORAL EVERY 4 HOURS PRN
Qty: 15 TABLET | Refills: 0 | Status: SHIPPED | OUTPATIENT
Start: 2021-08-16 | End: 2022-01-28

## 2021-08-16 RX ORDER — INDOCYANINE GREEN AND WATER 25 MG
2.5 KIT INJECTION ONCE
Status: COMPLETED | OUTPATIENT
Start: 2021-08-16 | End: 2021-08-16

## 2021-08-16 RX ORDER — NALOXONE HYDROCHLORIDE 0.4 MG/ML
0.2 INJECTION, SOLUTION INTRAMUSCULAR; INTRAVENOUS; SUBCUTANEOUS
Status: DISCONTINUED | OUTPATIENT
Start: 2021-08-16 | End: 2021-08-17 | Stop reason: HOSPADM

## 2021-08-16 RX ORDER — FLUMAZENIL 0.1 MG/ML
0.2 INJECTION, SOLUTION INTRAVENOUS
Status: DISCONTINUED | OUTPATIENT
Start: 2021-08-16 | End: 2021-08-16 | Stop reason: HOSPADM

## 2021-08-16 RX ORDER — FENTANYL CITRATE 50 UG/ML
25 INJECTION, SOLUTION INTRAMUSCULAR; INTRAVENOUS EVERY 5 MIN PRN
Status: DISCONTINUED | OUTPATIENT
Start: 2021-08-16 | End: 2021-08-16 | Stop reason: HOSPADM

## 2021-08-16 RX ORDER — ONDANSETRON 2 MG/ML
4 INJECTION INTRAMUSCULAR; INTRAVENOUS EVERY 30 MIN PRN
Status: DISCONTINUED | OUTPATIENT
Start: 2021-08-16 | End: 2021-08-17 | Stop reason: HOSPADM

## 2021-08-16 RX ORDER — HYDROMORPHONE HYDROCHLORIDE 1 MG/ML
0.2 INJECTION, SOLUTION INTRAMUSCULAR; INTRAVENOUS; SUBCUTANEOUS EVERY 5 MIN PRN
Status: DISCONTINUED | OUTPATIENT
Start: 2021-08-16 | End: 2021-08-16 | Stop reason: HOSPADM

## 2021-08-16 RX ORDER — AMOXICILLIN 250 MG
1-2 CAPSULE ORAL 2 TIMES DAILY
Qty: 30 TABLET | Refills: 0 | Status: SHIPPED | OUTPATIENT
Start: 2021-08-16 | End: 2022-01-28

## 2021-08-16 RX ORDER — BUPIVACAINE HYDROCHLORIDE 2.5 MG/ML
INJECTION, SOLUTION EPIDURAL; INFILTRATION; INTRACAUDAL
Status: DISCONTINUED | OUTPATIENT
Start: 2021-08-16 | End: 2021-08-16

## 2021-08-16 RX ORDER — CEFAZOLIN SODIUM 2 G/50ML
2 SOLUTION INTRAVENOUS SEE ADMIN INSTRUCTIONS
Status: DISCONTINUED | OUTPATIENT
Start: 2021-08-16 | End: 2021-08-16 | Stop reason: HOSPADM

## 2021-08-16 RX ORDER — ONDANSETRON 4 MG/1
4 TABLET, ORALLY DISINTEGRATING ORAL EVERY 30 MIN PRN
Status: DISCONTINUED | OUTPATIENT
Start: 2021-08-16 | End: 2021-08-17 | Stop reason: HOSPADM

## 2021-08-16 RX ORDER — OXYCODONE HYDROCHLORIDE 5 MG/1
5 TABLET ORAL EVERY 4 HOURS PRN
Status: DISCONTINUED | OUTPATIENT
Start: 2021-08-16 | End: 2021-08-17 | Stop reason: HOSPADM

## 2021-08-16 RX ADMIN — FENTANYL CITRATE 25 MCG: 50 INJECTION, SOLUTION INTRAMUSCULAR; INTRAVENOUS at 11:25

## 2021-08-16 RX ADMIN — ACETAMINOPHEN 975 MG: 325 TABLET ORAL at 08:57

## 2021-08-16 RX ADMIN — FENTANYL CITRATE 100 MCG: 50 INJECTION, SOLUTION INTRAMUSCULAR; INTRAVENOUS at 10:10

## 2021-08-16 RX ADMIN — PROPOFOL 150 MCG/KG/MIN: 10 INJECTION, EMULSION INTRAVENOUS at 10:13

## 2021-08-16 RX ADMIN — OXYCODONE HYDROCHLORIDE 5 MG: 5 TABLET ORAL at 11:37

## 2021-08-16 RX ADMIN — PROPOFOL 160 MG: 10 INJECTION, EMULSION INTRAVENOUS at 10:10

## 2021-08-16 RX ADMIN — BUPIVACAINE HYDROCHLORIDE 15 ML: 2.5 INJECTION, SOLUTION EPIDURAL; INFILTRATION; INTRACAUDAL at 10:00

## 2021-08-16 RX ADMIN — INDOCYANINE GREEN AND WATER 2.5 MG: KIT at 09:00

## 2021-08-16 RX ADMIN — ONDANSETRON 4 MG: 4 TABLET, ORALLY DISINTEGRATING ORAL at 12:33

## 2021-08-16 RX ADMIN — Medication 50 MG: at 10:10

## 2021-08-16 RX ADMIN — FENTANYL CITRATE 25 MCG: 50 INJECTION, SOLUTION INTRAMUSCULAR; INTRAVENOUS at 11:30

## 2021-08-16 RX ADMIN — ONDANSETRON 4 MG: 2 INJECTION INTRAMUSCULAR; INTRAVENOUS at 10:20

## 2021-08-16 RX ADMIN — Medication 100 MCG: at 10:46

## 2021-08-16 RX ADMIN — Medication 100 MCG: at 10:27

## 2021-08-16 RX ADMIN — DEXAMETHASONE SODIUM PHOSPHATE 4 MG: 4 INJECTION, SOLUTION INTRA-ARTICULAR; INTRALESIONAL; INTRAMUSCULAR; INTRAVENOUS; SOFT TISSUE at 10:20

## 2021-08-16 RX ADMIN — SODIUM CHLORIDE, SODIUM LACTATE, POTASSIUM CHLORIDE, CALCIUM CHLORIDE: 600; 310; 30; 20 INJECTION, SOLUTION INTRAVENOUS at 10:05

## 2021-08-16 RX ADMIN — FENTANYL CITRATE 25 MCG: 50 INJECTION, SOLUTION INTRAMUSCULAR; INTRAVENOUS at 11:20

## 2021-08-16 ASSESSMENT — MIFFLIN-ST. JEOR: SCORE: 1380.71

## 2021-08-16 NOTE — BRIEF OP NOTE
[facility] Brief Operative Note    Pre-operative diagnosis: Gallstones [K80.20]   Post-operative diagnosis Same as Pre-op Dx   Procedure: Procedure(s):  CHOLECYSTECTOMY, ROBOT-ASSISTED, LAPAROSCOPIC, WITHOUT CHOLANGIOGRAM   Surgeon: Mikael Guerrero MD   Assistants(s):    Estimated blood loss: * No values recorded between 8/16/2021 10:23 AM and 8/16/2021 11:05 AM *    Specimens: gb   Findings: yes

## 2021-08-16 NOTE — OR NURSING
Patient received bilateral Transverse Abdominis Plane nerve block  with Exparel.  Versed 2mg given. Tolerated procedure well.

## 2021-08-16 NOTE — ANESTHESIA PROCEDURE NOTES
TAP Procedure Note  Pre-Procedure   Staff -        Anesthesiologist:  Jeremias Branch MD       Performed By: anesthesiologist       Location: pre-op       Pre-Anesthestic Checklist: patient identified, IV checked, site marked, risks and benefits discussed, informed consent, monitors and equipment checked, pre-op evaluation, at physician/surgeon's request and post-op pain management  Timeout:       Correct Patient: Yes        Correct Procedure: Yes        Correct Site: Yes        Correct Position: Yes        Correct Laterality: Yes        Site Marked: Yes  Procedure Documentation  Procedure: TAP       Diagnosis: POST OPERATIVE PAIN       Laterality: bilateral       Patient Position: supine       Patient Prep/Sterile Barriers: sterile gloves, mask       Skin prep: Chloraprep       Needle Type: short bevel       Needle Gauge: 21.        Needle Length (millimeters): 110        Ultrasound guided       1. Ultrasound was used to identify targeted nerve, plexus, vascular marker, or fascial plane and place a needle adjacent to it in real-time.       2. Ultrasound was used to visualize the spread of anesthetic in close proximity to the above referenced structure.       3. A permanent image is entered into the patient's record.    Assessment/Narrative         The placement was negative for: blood aspirated, painful injection and site bleeding       Paresthesias: No.     Bolus given via needle..        Secured via.        Insertion/Infusion Method: Single Shot       Complications: none       Injection made incrementally with aspirations every 5 mL.    Medication(s) Administered   Bupivacaine 0.25% PF (Infiltration), 15 mL  Bupivacaine liposome (Exparel) 1.3% LA inj susp (Infiltration), 20 mL  Medication Administration Time: 8/16/2021 10:00 AM    Comments:  266mg exparel

## 2021-08-16 NOTE — ANESTHESIA POSTPROCEDURE EVALUATION
Patient: Debbie Hood    Procedure(s):  CHOLECYSTECTOMY, ROBOT-ASSISTED, LAPAROSCOPIC, WITHOUT CHOLANGIOGRAM    Diagnosis:Gallstones [K80.20]  Diagnosis Additional Information: No value filed.    Anesthesia Type:  General    Note:  Disposition: Outpatient   Postop Pain Control: Uneventful            Sign Out: Well controlled pain   PONV: No   Neuro/Psych: Uneventful            Sign Out: Acceptable/Baseline neuro status   Airway/Respiratory: Uneventful            Sign Out: Acceptable/Baseline resp. status   CV/Hemodynamics: Uneventful            Sign Out: Acceptable CV status; No obvious hypovolemia; No obvious fluid overload   Other NRE: NONE   DID A NON-ROUTINE EVENT OCCUR? No           Last vitals:  Vitals Value Taken Time   /73 08/16/21 1138   Temp 35.8  C (96.4  F) 08/16/21 1138   Pulse 67 08/16/21 1138   Resp 15 08/16/21 1138   SpO2 98 % 08/16/21 1138       Electronically Signed By: Jeremias Branch MD  August 16, 2021  2:15 PM

## 2021-08-16 NOTE — OP NOTE
Procedure Date: 08/16/2021    PREOPERATIVE DIAGNOSIS:  Symptomatic cholelithiasis.    POSTOPERATIVE DIAGNOSIS:  Symptomatic cholelithiasis.    OPERATIVE PROCEDURE:  Laparoscopic cholecystectomy, robot-assisted.    SURGEON:  Mikael Guerrero MD    ANESTHESIA:  General endotracheal.    INDICATIONS:  The patient presents with symptomatic cholelithiasis.  Informed consent was obtained.    OPERATIVE FINDINGS:  Dense adhesions around gallbladder with hydrops and multiple gallstones.  Refer to below.    DESCRIPTION OF PROCEDURE:  The patient was brought to the operating room, put under general anesthesia and widely prepped and draped in the usual sterile fashion.  Infraumbilical skin incision was made, open technique used, Horacio port placed.  The abdomen was insufflated, 5 mm accessory port was placed left subcostal and 8 ports were placed in right lateral per routine technique.  There were dense adhesions around the gallbladder.  As such, I elected to use the scope to visualize the adhesions to the anterior abdominal wall and to the distal fundus of the gallbladder and took these down using laparoscopic scissors and grasper.  This allowed for better grasp of the gallbladder.  At this point, the robot was docked.  Attention turned to the console where the remainder of the adhesions were taken down without difficulty and using Firefly technique, I was easily able to identify the common duct and the cystic duct.  The cystic duct was dissected proximally and distally and the cystic artery was identified and hemostasis was achieved using a fenestrated bipolar at these cystic artery.  Cystic duct was then clipped proximally and distally with Hem-o-raulito and transected with cutting cautery and the gallbladder was taken off the liver bed using electrocautery with excellent hemostasis noted throughout.  At this point, the robot was then docked and attention turned to laparoscopic technique where the gallbladder was removed under  direct vision at the infraumbilical port site.  This was sent to pathology for permanent section.  The abdomen had been deflated and the fascial defect closed with 0 Vicryl, skin with subcuticular, Steri-Strips were applied.  Estimated blood loss was minimal.  The patient tolerated the procedure well and was taken to recovery room where she was without difficulty or complication.    Mikael Guerrero MD        D: 2021   T: 2021   MT:     Name:     KADEN ROMERO  MRN:      [id number]        Account:        [id number]   :      1975           Procedure Date: 2021     Document: [id number]

## 2021-08-16 NOTE — DISCHARGE INSTRUCTIONS
"[facility]  Home Care Following Anesthesia  For 24 hours after surgery:  1. Get plenty of rest.  A responsible adult must stay with you for at least 24 hours after you leave the surgery center.  2. Do not drive or use heavy equipment.  If you have weakness or tingling, don't drive or use heavy equipment until this feeling goes away.   3. Do not drink alcohol.   4. Avoid strenuous or risky activities.  Ask for help when climbing stairs.  5. You may feel lightheaded.  IF so, sit for a few minutes before standing.  Have someone help you get up.   6. If you have nausea (feel sick to your stomach): Drink only clear liquids such as apple juice, ginger ale, broth or 7-Up.  Rest may also help.  Be sure to drink enough fluids.  Move to a regular diet as you feel able.   7. You may have a slight fever.  Call the doctor if your fever is over 100 F (37.7 C) (taken under the tongue) or lasts longer than 24 hours.  8. You may have a dry mouth, a sore throat, muscle aches or trouble sleeping. These should go away after 24 hours.  9. Do not make important or legal decisions.   10. It is recommended to avoid smoking.   If you use hormonal birth control (such as the pill, patch, ring or implants):  You will need a second form of birth control for 7 days (condoms, a diaphragm or contraceptive foam).  While in the surgery center, you received a medicine called Sugammadex.  Hormonal birth control (such as the pill, patch, ring or implants) will not work as well for a week after taking this medicine.  Today you received an Exparel block to numb the nerves near your surgery site.  This is a block using local anesthetic or \"numbing\" medication injected around the nerves to anesthetize or \"numb\" the area supplied by those nerves.  This block is injected into the muscle layer near your surgical site.  This medication may numb the location where you had surgery up to 72 hours.  If your surgical site is an " arm or leg you should be careful with your affected limb, since it is possible to injure your limb without being aware of it due to the numbing.  Until full feeling returns, you should guard against bumping or hitting your limb, and avoid extreme hot or cold temperatures on the skin.  As the block wears off, the feeling will return as a tingling or prickly sensation near your surgical site.  You will experince more discomfort from your incision as the feeling returns.  You may want to take a pain pill (a narcotic or Tylenol if this was prescribed by your surgeon) when you start to experience mild pain before the pain beomes more severe.  If your pain medications do not control your pain, you should notify your surgeon.    Tips for taking pain medications  To get the best pain relief possible, remember these points:    Take pain medications as directed, before pain becomes severe.    Pain medication can upset your stomach: taking it with food may help.    Constipation is a common side effect of pain medication. Drink plenty of  fluids.    Eat foods high in fiber. Take a stool softener if recommended by your doctor or pharmacist.    Do not drink alcohol, drive or operate machinery while taking pain medications.    Ask about other ways to control pain, such as with heat, ice or relaxation.    Tylenol/Acetaminophen Consumption  To help encourage the safe use of acetaminophen, the makers of TYLENOL  have lowered the maximum daily dose for single-ingredient Extra Strength TYLENOL  (acetaminophen) products sold in the [place] from 8 pills per day (4,000 mg) to 6 pills per day (3,000 mg). The dosing interval has also changed from 2 pills every 4-6 hours to 2 pills every 6 hours.    If you feel your pain relief is insufficient, you may take Tylenol/Acetaminophen in addition to your narcotic pain medication.     Be careful not to exceed 3,000 mg of Tylenol/Acetaminophen in a 24 hour period from all sources.    If you are taking  extra strength Tylenol/acetaminophen (500 mg), the maximum dose is 6 tablets in 24 hours.    If you are taking regular strength acetaminophen (325 mg), the maximum dose is 9 tablets in 24 hours.    Call a doctor for any of the followin. Signs of infection (fever, growing tenderness at the surgery site, a large amount of drainage or bleeding, severe pain, foul-smelling drainage, redness, swelling).  2. It has been over 8 to 10 hours since surgery and you are still not able to urinate (pass water).  3. Headache for over 24 hours.  4. Numbness, tingling or weakness the day after surgery (if you had spinal anesthesia).  5. Signs of Covid-19 infection (temperature over 100 degrees, shortness of breath, cough, loss of taste/smell, generalized body aches, persistent headache, chills, sore throat, nausea/vomiting/diarrhea)  Your doctor is:  Dr. Mikael Guerrero, General Surgery: [phone]                    Or dial [phone] and ask for the resident on call for:  General Surgery  For emergency care, call the:  [facility]:  [phone] (TTY for hearing impaired: [phone])

## 2021-08-16 NOTE — ANESTHESIA PREPROCEDURE EVALUATION
Anesthesia Pre-Procedure Evaluation    Patient: Debbie Hood   MRN: [id number] : 1975        Preoperative Diagnosis: Gallstones [K80.20]   Procedure : Procedure(s):  CHOLECYSTECTOMY, ROBOT-ASSISTED, LAPAROSCOPIC, WITHOUT CHOLANGIOGRAM     Past Medical History:   Diagnosis Date     Hypertension     borderline high BP few years back while on OCP, now normal.     Hypertension in pregnancy, pre-existing 2013    Mild diastolic HTN - documented prior to pregnancy.  Baseline labs checked and normal.  Baseline 24-hr urine protein = 0.07g/24hr.      Mastitis, acute 10/2013     Preeclampsia complicating hypertension 10/2013    IOL at term     Skin cancer of eyelid, left 2020      Past Surgical History:   Procedure Laterality Date     HC CLOSED TX ELBOW DISLOCATION W/O ANESTHESIA       MOHS MICROGRAPHIC PROCEDURE Left 2020    basal cell carcinoma of left lower eyelid     TONSILLECTOMY & ADENOIDECTOMY        Allergies   Allergen Reactions     Kiwi       Social History     Tobacco Use     Smoking status: Former Smoker     Packs/day: 0.00     Types: Cigarettes     Quit date: 2008     Years since quittin.0     Smokeless tobacco: Never Used     Tobacco comment: 5 cigs a day   Substance Use Topics     Alcohol use: Yes     Alcohol/week: 0.0 standard drinks     Comment: 2 drinks a week      Wt Readings from Last 1 Encounters:   21 73.5 kg (162 lb)        Anesthesia Evaluation            ROS/MED HX  ENT/Pulmonary:     (+) allergic rhinitis,     Neurologic:       Cardiovascular:       METS/Exercise Tolerance:     Hematologic:       Musculoskeletal:       GI/Hepatic:       Renal/Genitourinary:       Endo:       Psychiatric/Substance Use:       Infectious Disease:       Malignancy:       Other:            Physical Exam    Airway        Mallampati: I       Respiratory Devices and Support         Dental           Cardiovascular          Rhythm and rate: regular and normal     Pulmonary            breath sounds clear to auscultation           OUTSIDE LABS:  CBC:   Lab Results   Component Value Date    WBC 8.4 06/02/2021    WBC 7.8 12/13/2019    HGB 14.3 06/02/2021    HGB 14.2 12/13/2019    HCT 45.4 06/02/2021    HCT 44.8 12/13/2019     06/02/2021     12/13/2019     BMP:   Lab Results   Component Value Date     06/02/2021    .6 12/13/2019    POTASSIUM 4.7 06/02/2021    POTASSIUM 3.7 12/13/2019    CHLORIDE 107 06/02/2021    CHLORIDE 100.6 12/13/2019    CO2 27 06/02/2021    CO2 26.8 12/13/2019    BUN 11 06/02/2021    BUN 11.6 12/13/2019    CR 0.68 06/02/2021    CR 0.6 12/13/2019    GLC 90 06/02/2021    GLC 94.9 12/13/2019     COAGS: No results found for: PTT, INR, FIBR  POC:   Lab Results   Component Value Date    HCG Negative 08/16/2021     HEPATIC:   Lab Results   Component Value Date    ALBUMIN 2.5 (L) 01/14/2016    PROTTOTAL 6.1 (L) 01/14/2016    ALT 21 01/14/2016    AST 13 01/14/2016    ALKPHOS 189 (H) 01/14/2016    BILITOTAL 0.4 01/14/2016     OTHER:   Lab Results   Component Value Date    A1C 5.4 06/02/2021    ALFONSO 9.3 06/02/2021    TSH 3.25 06/02/2021       Anesthesia Plan    ASA Status:  1   NPO Status:  NPO Appropriate    Anesthesia Type: General.     - Airway: ETT   Induction: Intravenous.   Maintenance: TIVA.        Consents    Anesthesia Plan(s) and associated risks, benefits, and realistic alternatives discussed. Questions answered and patient/representative(s) expressed understanding.     - Discussed with:  Patient         Postoperative Care    Pain management: Oral pain medications, Peripheral nerve block (Single Shot), Multi-modal analgesia.   PONV prophylaxis: Ondansetron (or other 5HT-3), Dexamethasone or Solumedrol     Comments:                Jeremias Branch MD

## 2021-08-16 NOTE — ANESTHESIA CARE TRANSFER NOTE
Patient: Debbie Hood    Procedure(s):  CHOLECYSTECTOMY, ROBOT-ASSISTED, LAPAROSCOPIC, WITHOUT CHOLANGIOGRAM    Diagnosis: Gallstones [K80.20]  Diagnosis Additional Information: No value filed.    Anesthesia Type:   No value filed.     Note:    Oropharynx: oropharynx clear of all foreign objects  Level of Consciousness: awake  Oxygen Supplementation: face mask    Independent Airway: airway patency satisfactory and stable  Dentition: dentition unchanged  Vital Signs Stable: post-procedure vital signs reviewed and stable  Report to RN Given: handoff report given  Patient transferred to: PACU    Handoff Report: Identifed the Patient, Identified the Reponsible Provider, Reviewed the pertinent medical history, Discussed the surgical course, Reviewed Intra-OP anesthesia mangement and issues during anesthesia, Set expectations for post-procedure period and Allowed opportunity for questions and acknowledgement of understanding      Vitals:  Vitals Value Taken Time   /86 08/16/21 1114   Temp 35.8  C (96.4  F) 08/16/21 1114   Pulse 77 08/16/21 1116   Resp 15 08/16/21 1116   SpO2 97 % 08/16/21 1116   Vitals shown include unvalidated device data.    Electronically Signed By: STEVE Okeefe CRNA  August 16, 2021  11:18 AM

## 2021-08-18 ENCOUNTER — PATIENT OUTREACH (OUTPATIENT)
Dept: SURGERY | Facility: CLINIC | Age: 46
End: 2021-08-18

## 2021-08-18 LAB
PATH REPORT.COMMENTS IMP SPEC: NORMAL
PATH REPORT.COMMENTS IMP SPEC: NORMAL
PATH REPORT.FINAL DX SPEC: NORMAL
PATH REPORT.GROSS SPEC: NORMAL
PATH REPORT.MICROSCOPIC SPEC OTHER STN: NORMAL
PATH REPORT.RELEVANT HX SPEC: NORMAL
PHOTO IMAGE: NORMAL

## 2021-08-18 NOTE — PROGRESS NOTES
Debbie Hood is a patient of Dr. Mikael Guerrero that underwent robotic cholecystectomy approximately 2 days ago (8/16).  Attempted to contact patient via telephone for a status update and review post op teaching.  LM on  to call office.  Await return call.      Of note:  Pathology:  Pending  Wound:  Steri-strips  Follow-up:  Routine  Restrictions:  - No strenuous exercise for 3-4 weeks  - No lifting, pushing, pulling more than 15-20 pounds for 3-4 weeks  New medications:  Norco, Senna  Equipment/Supplies:  None    ADDENDUM  08/19/21  9:39 AM  Attempted to contact patient x 2 for post procedure telephone call/status update.  LM on  to call office-contact information provided.    Pathology:  Surgical Pathology Exam: [id number]  Order: [id number]  Collected:  8/16/2021 10:55 AM Status:  Final result   Visible to patient:  No (scheduled for 8/25/2021 11:33 AM)   0 Result Notes  Component  Resulting Agency   Final Diagnosis   GALLBLADDER; CHOLECYSTECTOMY:   Chronic cholecystitis with calculi            Maryellen Owens RN, BSN, CNOR, RNFA, CBCN  [facility] General Surgery

## 2021-08-19 NOTE — PROGRESS NOTES
RN Post-Op/Post-Discharge Care Coordination Note    Spoke with Patient.    Support  Patient able to care for self independently     Health Status  Nausea/Vomiting: Patient denies nausea/vomiting.  Eating/drinking: Patient is able to eat and drink without any complaints.  Bowel habits: Patient reports having a normal bowel movement.  Drains (RICHARD): N/A  Fevers/chills: Patient denies any fever or chills.  Incisions: Patient denies any signs and symptoms of infection..  Wound closure:  Steri-strips  Pain: Minimal to none.  Patient has transitioned to OTC Tylenol PRN per package instructions  New Medications:  Norco, Senna    Activity/Restrictions  No lifting in excess of 15-20 pounds for 3-4 weeks    Equipment  None    Pathology reviewed with patient:  Yes    Forms/Letters  No    All of her questions were answered including reviewing restrictions, pathology, and wound care.  She will call this office if she has any further questions and/or concerns.      In lieu of a post-op clinic patient that patient would like to be contacted in approximately 7-10 days via telephone.    A copy of this note was routed to the primary surgeon.      Whom and When to Call  Patient acknowledges understanding of how to manage any medication changes and   when to seek medical care.     Patient advised that if after hour medical concerns arise to please call [phone] and choose option 4 to speak to the physician on call.

## 2021-08-26 ENCOUNTER — PATIENT OUTREACH (OUTPATIENT)
Dept: SURGERY | Facility: CLINIC | Age: 46
End: 2021-08-26

## 2021-08-26 NOTE — PROGRESS NOTES
Debbie Hood is a patient of Dr. Mikael Guerrero that recently underwent a surgical procedure (robotic cholecystectomy).  The patient was contacted via telephone approximately 7-10 days ago for a status update and post-op teaching.  In lieu of a clinic visit, the patient requested to be contacted at a later date by an RN for an assessment.    Attempted to contact patient via telephone for a status update.  LM on VM to call office.

## 2021-08-27 NOTE — PROGRESS NOTES
Attempted to contact patient x 2 for 7-10 day telephone call/status update.  LM on VM to call office-contact information provided.

## 2021-09-01 ENCOUNTER — PATIENT OUTREACH (OUTPATIENT)
Dept: SURGERY | Facility: CLINIC | Age: 46
End: 2021-09-01

## 2021-09-01 NOTE — PROGRESS NOTES
Debbie Hood was contacted several days post procedure via telephone for a status update and elected to have a telephone follow -up call approximately 7-10 days after original contact in lieu of a clinic visit with Dr. Mikael Guerrero.  She continues to do well and the steri-strips remain in place- she may remove them on Saturday, if she desires. The patients wounds are healed and the area is C/D/I.  She is afebrile, pain free, and anna po; the patient is slowly resuming her normal activity.   Discussed restrictions including no lifting in excess of 15-20 pounds for 3 weeks.    Pathology was reviewed with the patient: Yes    All of Debbie Hood question's were answered and  she would like to return to the clinic on a PRN basis.  The patient is aware that  she may schedule a post op appointment at any time.    A copy of this note was routed to the patients surgeon.      Pathology:  Case Report   Surgical Pathology Report                         Case: [id number]                                   Authorizing Provider:  Mikael Guerrero MD  Collected:           08/16/2021 10:55 AM           Ordering Location:     [facility] OR  Received:            08/16/2021 11:17 AM                                  [place]                                                                   Pathologist:           Madeline Hardy MD                                                            Specimen:    Gallbladder, Gallbladder and Contents                                                      Final Diagnosis   GALLBLADDER; CHOLECYSTECTOMY:   Chronic cholecystitis with calculi

## 2021-09-09 ENCOUNTER — TRANSFERRED RECORDS (OUTPATIENT)
Dept: HEALTH INFORMATION MANAGEMENT | Facility: CLINIC | Age: 46
End: 2021-09-09

## 2021-09-25 ENCOUNTER — HEALTH MAINTENANCE LETTER (OUTPATIENT)
Age: 46
End: 2021-09-25

## 2022-01-28 ENCOUNTER — OFFICE VISIT (OUTPATIENT)
Dept: PODIATRY | Facility: CLINIC | Age: 47
End: 2022-01-28
Payer: COMMERCIAL

## 2022-01-28 VITALS — WEIGHT: 162 LBS | OXYGEN SATURATION: 99 % | HEIGHT: 65 IN | BODY MASS INDEX: 26.99 KG/M2 | HEART RATE: 91 BPM

## 2022-01-28 DIAGNOSIS — M24.573 EQUINUS CONTRACTURE OF ANKLE: ICD-10-CM

## 2022-01-28 DIAGNOSIS — M72.2 PLANTAR FASCIITIS, BILATERAL: Primary | ICD-10-CM

## 2022-01-28 PROCEDURE — 99203 OFFICE O/P NEW LOW 30 MIN: CPT | Performed by: PODIATRIST

## 2022-01-28 RX ORDER — NAPROXEN 500 MG/1
500 TABLET ORAL 2 TIMES DAILY WITH MEALS
Qty: 28 TABLET | Refills: 0 | Status: SHIPPED | OUTPATIENT
Start: 2022-01-28 | End: 2022-10-23

## 2022-01-28 ASSESSMENT — MIFFLIN-ST. JEOR: SCORE: 1380.71

## 2022-01-28 ASSESSMENT — PAIN SCALES - GENERAL: PAINLEVEL: MODERATE PAIN (4)

## 2022-01-28 NOTE — LETTER
1/28/2022         RE: Debbie Hood  [address]  [place] [address]        Dear Colleague,    Thank you for referring your patient, Debbie Hood, to the [facility]. Please see a copy of my visit note below.    FOOT AND ANKLE SURGERY/PODIATRY Progress Note        ASSESSMENT:   Plantar Fasciitis  Gastrosoleus Equinus       TREATMENT:  -Patient has pain along the plantar heel at insertion of plantar fascia consistent with plantar fasciitis. We discussed treatment options to include stretching exercises, anti-inflammatory medication, orthotics, steroid injections, physical therapy and a night splint.     -All questions invited and answered. I will start her on Naproxen and have referred her to [facility] for custom orthotics.     -I have asked her to follow-up after using the orthotics x3-4 weeks if symptoms continue.     Bacilio Neil DPM  [facility]      HPI: I was asked to see Debbie Hood today for bilateral arch pain. The patient states she started to notice arch pain around the beginning of covid and describes having pain when working out. Denies pain with casual walking. She has tried over the counter inserts with minimal relief.     Past Medical History:   Diagnosis Date     Hypertension     borderline high BP few years back while on OCP, now normal.     Hypertension in pregnancy, pre-existing 2/26/2013    Mild diastolic HTN - documented prior to pregnancy.  Baseline labs checked and normal.  Baseline 24-hr urine protein = 0.07g/24hr.      Mastitis, acute 10/2013     Preeclampsia complicating hypertension 10/2013    IOL at term     Skin cancer of eyelid, left 01/2020       Past Surgical History:   Procedure Laterality Date     DAVINCI LAPAROSCOPIC CHOLECYSTECTOMY WITHOUT GRAMS N/A 8/16/2021    Procedure: CHOLECYSTECTOMY, ROBOT-ASSISTED, LAPAROSCOPIC, WITHOUT CHOLANGIOGRAM;  Surgeon: Mikael Guerrero MD;   Location: [facility] OR     MOHS MICROGRAPHIC PROCEDURE Left 2020    basal cell carcinoma of left lower eyelid     TONSILLECTOMY & ADENOIDECTOMY       [facility] CLOSED TX ELBOW DISLOCATION W/O ANESTHESIA         Allergies   Allergen Reactions     Kiwi          Current Outpatient Medications:      levonorgestrel (MIRENA) 20 MCG/24HR IUD, 1 each (20 mcg) by Intrauterine route continuous, Disp: , Rfl:      naproxen (NAPROSYN) 500 MG tablet, Take 1 tablet (500 mg) by mouth 2 times daily (with meals), Disp: 28 tablet, Rfl: 0     order for DME, Equipment being ordered: BP cuff. Measure daily and if 160/110 go to hospital., Disp: 1 Units, Rfl: 0     triamcinolone (NASACORT) 55 MCG/ACT Inhaler, Spray 2 sprays into both nostrils daily, Disp: , Rfl:      multivitamin w/minerals (THERA-VIT-M) tablet, Take 1 tablet by mouth daily (Patient not taking: Reported on 2022), Disp: , Rfl:     Family History   Problem Relation Age of Onset     Hypertension Father      Breast Cancer Paternal Grandmother      Thyroid Disease Maternal Grandmother      Depression Sister      Melanoma No family hx of        Social History     Socioeconomic History     Marital status:      Spouse name: Khai     Number of children: 1     Years of education: 18     Highest education level: Not on file   Occupational History     Occupation:      Employer: [employer]   Tobacco Use     Smoking status: Former Smoker     Packs/day: 0.00     Types: Cigarettes     Quit date: 2008     Years since quittin.4     Smokeless tobacco: Never Used     Tobacco comment: 5 cigs a day   Substance and Sexual Activity     Alcohol use: Yes     Alcohol/week: 0.0 standard drinks     Comment: 2 drinks a week     Drug use: No     Sexual activity: Yes     Partners: Male     Birth control/protection: I.U.D.   Other Topics Concern     Parent/sibling w/ CABG, MI or angioplasty before 65F 55M? 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    OBJECTIVE:  Appearance: alert, well appearing, and in no distress.    General appearance: Patient is alert and fully cooperative with history & exam.  No sign of distress is noted during the visit.     Psychiatric: Affect is pleasant & appropriate.  Patient appears motivated to improve health.     Respiratory: Breathing is regular & unlabored while sitting.     HEENT: Hearing is intact to spoken word.  Speech is clear.  No gross evidence of visual impairment that would impact ambulation.    Vascular: Dorsalis pedis and posterior tibial pulses are palpable. There is pedal hair growth bilateral.  CFT < 3 sec from anterior tibial surface to distal digits bilateral. There is no appreciable edema noted.  Dermatologic: Turgor and texture are within normal limits. No coloration or temperature changes. No primary or secondary lesions noted.  Neurologic: All epicritic and proprioceptive sensations are grossly intact bilateral.  Musculoskeletal: Mild pain along the plantar right arch along the medial band of the plantar fascia. Limited ankle dorsiflexion with knee extended and flexed.               Again, thank you for allowing me to participate in the care of your patient.        Sincerely,        Bacilio Neil DPM

## 2022-01-28 NOTE — PROGRESS NOTES
FOOT AND ANKLE SURGERY/PODIATRY Progress Note        ASSESSMENT:   Plantar Fasciitis  Gastrosoleus Equinus       TREATMENT:  -Patient has pain along the plantar heel at insertion of plantar fascia consistent with plantar fasciitis. We discussed treatment options to include stretching exercises, anti-inflammatory medication, orthotics, steroid injections, physical therapy and a night splint.     -All questions invited and answered. I will start her on Naproxen and have referred her to [facility] for custom orthotics.     -I have asked her to follow-up after using the orthotics x3-4 weeks if symptoms continue.     Bacilio Neil DPM  [facility] Podiatry/Foot & Ankle Surgery      HPI: I was asked to see Debbie Hood today for bilateral arch pain. The patient states she started to notice arch pain around the beginning of covid and describes having pain when working out. Denies pain with casual walking. She has tried over the counter inserts with minimal relief.     Past Medical History:   Diagnosis Date     Hypertension     borderline high BP few years back while on OCP, now normal.     Hypertension in pregnancy, pre-existing 2/26/2013    Mild diastolic HTN - documented prior to pregnancy.  Baseline labs checked and normal.  Baseline 24-hr urine protein = 0.07g/24hr.      Mastitis, acute 10/2013     Preeclampsia complicating hypertension 10/2013    IOL at term     Skin cancer of eyelid, left 01/2020       Past Surgical History:   Procedure Laterality Date     DAVINCI LAPAROSCOPIC CHOLECYSTECTOMY WITHOUT GRAMS N/A 8/16/2021    Procedure: CHOLECYSTECTOMY, ROBOT-ASSISTED, LAPAROSCOPIC, WITHOUT CHOLANGIOGRAM;  Surgeon: Mikael Guerrero MD;  Location: [facility] OR     MOHS MICROGRAPHIC PROCEDURE Left 01/2020    basal cell carcinoma of left lower eyelid     TONSILLECTOMY & ADENOIDECTOMY  1993     ZZHC CLOSED TX ELBOW DISLOCATION W/O ANESTHESIA  1990       Allergies   Allergen Reactions     Kiwi           Current Outpatient Medications:      levonorgestrel (MIRENA) 20 MCG/24HR IUD, 1 each (20 mcg) by Intrauterine route continuous, Disp: , Rfl:      naproxen (NAPROSYN) 500 MG tablet, Take 1 tablet (500 mg) by mouth 2 times daily (with meals), Disp: 28 tablet, Rfl: 0     order for DME, Equipment being ordered: BP cuff. Measure daily and if 160/110 go to hospital., Disp: 1 Units, Rfl: 0     triamcinolone (NASACORT) 55 MCG/ACT Inhaler, Spray 2 sprays into both nostrils daily, Disp: , Rfl:      multivitamin w/minerals (THERA-VIT-M) tablet, Take 1 tablet by mouth daily (Patient not taking: Reported on 2022), Disp: , Rfl:     Family History   Problem Relation Age of Onset     Hypertension Father      Breast Cancer Paternal Grandmother      Thyroid Disease Maternal Grandmother      Depression Sister      Melanoma No family hx of        Social History     Socioeconomic History     Marital status:      Spouse name: Khai     Number of children: 1     Years of education: 18     Highest education level: Not on file   Occupational History     Occupation:      Employer: [employer]   Tobacco Use     Smoking status: Former Smoker     Packs/day: 0.00     Types: Cigarettes     Quit date: 2008     Years since quittin.4     Smokeless tobacco: Never Used     Tobacco comment: 5 cigs a day   Substance and Sexual Activity     Alcohol use: Yes     Alcohol/week: 0.0 standard drinks     Comment: 2 drinks a week     Drug use: No     Sexual activity: Yes     Partners: Male     Birth control/protection: I.U.D.   Other Topics Concern     Parent/sibling w/ CABG, MI or angioplasty before 65F 55M? 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    OBJECTIVE:  Appearance: alert, well appearing, and in no distress.    General appearance: Patient is alert and fully cooperative with history & exam.  No sign of distress is noted during the visit.     Psychiatric: Affect is pleasant & appropriate.  Patient appears motivated to improve health.     Respiratory: Breathing is regular & unlabored while sitting.     HEENT: Hearing is intact to spoken word.  Speech is clear.  No gross evidence of visual impairment that would impact ambulation.    Vascular: Dorsalis pedis and posterior tibial pulses are palpable. There is pedal hair growth bilateral.  CFT < 3 sec from anterior tibial surface to distal digits bilateral. There is no appreciable edema noted.  Dermatologic: Turgor and texture are within normal limits. No coloration or temperature changes. No primary or secondary lesions noted.  Neurologic: All epicritic and proprioceptive sensations are grossly intact bilateral.  Musculoskeletal: Mild pain along the plantar right arch along the medial band of the plantar fascia. Limited ankle dorsiflexion with knee extended and flexed.

## 2022-01-28 NOTE — PATIENT INSTRUCTIONS
What is Plantar Fasciitis?  Plantar Fasciitis also referred to as  heel pain syndrome  is the most common cause cited for pain in heels. Plantar Fasciitis is the pain and inflammation at the point where the flat band of tissue called the plantar fascia connects the heel bone to the toes. Plantar fascia maintains the arch of the foot. Applying pressure on it will make it swollen, weak, and irritated. This will make the heel of your foot to ache when you walk or stand for a prolonged period.    Symptoms  Most people suffering from Plantar Fasciitis experience pain when they walk after resting their feet for a long duration. You may experience less pain and stiffness after a few steps. But your foot may hurt more as the day goes on. It may hurt the most when you climb stairs or after you stand for a long time. Continuous foot pain at night might be due to different problems like arthritis or nerve problems.    Causes  Straining the ligament which supports the arch can cause Plantar Fasciitis. Repeated straining can cause tiny tears in the ligament which lead to pain and swelling. Plantar Fasciitis is very evident in cases of:    Tight Achilles tendon or calf muscles     Running long distances, especially on hard & uneven surfaces     Problems of the foot arch     Sudden obesity     Wearing shoes with soft soles or poor arch support     In-toeing        [facility]  [facility]  [address]  [place] [address]  Phone: [phone]  Fax: [phone] [facility]  [address] [place] [address]  Home Medical Equipment, [address] Phone: [phone]  Orthotics and Prosthetics, [address]  Phone [phone] [facility]  [address] [place] [address] [address]  [place] [address]  Phone: [phone]   Fax: [phone]   [facility]  [address]  [place]  [place] [address]  Phone: [phone]  Fax: [phone] [facility]   Home Medical Equipment   [address], [place] [address]  Phone :[phone]  Orthotics and Prosthetics  [address], [address], [facility] [address]  Phone:[phone]   [facility]  [address]  [place] [address]  Phone: [phone]   Fax: [phone]   [facility]   [address]  [place] [address]  Phone: [phone]  Fax: [phone] [facility]   [address]  [place] [address]  Phone: [phone] [place]  [address] [place] [address]  Phone :[phone]             WEARING YOUR CUSTOM FOOT ORTHOTICS   Most insurance plans cover one pair of orthotics per year. You must check with your   insurance plan to see what your payment responsibility will be. Please call your   insurance company by calling the number on the back of your insurance card.   Orthotic's are non-refundable and non-returnable.   Orthotics are made of various designs. Some orthotics are covered with material that extends beyond your toes. If your orthotic is of this design, you will likely need to trim the toe end to get a proper fit. The insole from your shoe can be used as a template. Simply overlay the shoe insert on top of the custom orthotic. Align the heel end while tracing the length of the insert onto the custom orthotic. Use a large scissor to trim the toe end until you get a proper fit in the shoe.   The orthotic needs to be pushed as far back in the shoe as possible. The heel portion should not ride forward so as not to irritate your heel.   Orthotics are designed to work with socks. Excessive perspiration will shorten the life span of the orthotics. Remove the orthotic from the shoe frequently for proper drying.   The break-in period lasts for weeks. People new to orthotics will likely experience new  aches and pains. The orthotic is forcing your foot into a new position. Arch, foot and leg muscle aches and fatigue are common during these weeks. Minor discomfort can be considered normal break in phenomenon. Start wearing your orthotic around your home your first day. Limited activity for one to two hours is recommended. You can increase one or two additional hours each day provided the aches and pains are subsiding. The degree of discomfort, fatigue and problems will dictate the speed of break in. You may require multiple weeks to work up to full time use.   Do not continue wearing your orthotics if they are creating problems such as blisters or sores. Do not hesitate to call the clinic to speak with a nurse regarding orthotic   break in, fit, trimming, etc. You may also need to see the doctor if the orthotics are   simply not working out. Adjustments are sometimes made to improve orthotic   function.     Orthotics will only work in certain styles and types of shoes. Orthotics rarely work in dress shoes. Slip-ons, clogs, sandals and heels are particularly troublesome. Specially designed orthotics may be necessary for these types of shoes. Your custom orthotic was designed for activities that require appropriate walking or running shoes. Lace up athletic shoes, walking shoes or work boots should work appropriately. You may need a wider or longer shoe. Shoes with a removable  or insert work best. In general, you want to remove an insert from the shoe before placing the orthotic into the shoe. Shoes without a removable liner may not work as well.     When purchasing new shoes, bring your orthotics along to get a proper fit. Shop at stores that are familiar with orthotics.   Frequent washing of the orthotic may shorten the life span of the top cover. The top cover can be replaced but will generally last one to five years depending on use and foot perspiration.     Naproxen and naproxen sodium oral  immediate-release tablets    Brand Names: Aflaxen, Aleve, Aleve Arthritis, All Day Relief, Anaprox, Anaprox DS, Naprosyn   What is this medicine?  NAPROXEN (na PROX en) is a non-steroidal anti-inflammatory drug (NSAID). It is used to reduce swelling and to treat pain. This medicine may be used for dental pain, headache, or painful monthly periods. It is also used for painful joint and muscular problems such as arthritis, tendinitis, bursitis, and gout.  How should I use this medicine?  Take this medicine by mouth with a glass of water. Follow the directions on the prescription label. Take it with food if your stomach gets upset. Try to not lie down for at least 10 minutes after you take it. Take your medicine at regular intervals. Do not take your medicine more often than directed. Long-term, continuous use may increase the risk of heart attack or stroke.  A special MedGuide will be given to you by the pharmacist with each prescription and refill. Be sure to read this information carefully each time.  Talk to your pediatrician regarding the use of this medicine in children. Special care may be needed.  What side effects may I notice from receiving this medicine?  Side effects that you should report to your doctor or health care professional as soon as possible:    black or bloody stools, blood in the urine or vomit    blurred vision    chest pain    difficulty breathing or wheezing    nausea or vomiting    severe stomach pain    skin rash, skin redness, blistering or peeling skin, hives, or itching    slurred speech or weakness on one side of the body    swelling of eyelids, throat, lips    unexplained weight gain or swelling    unusually weak or tired    yellowing of eyes or skin  Side effects that usually do not require medical attention (report to your doctor or health care professional if they continue or are bothersome):    constipation    headache    heartburn  What may interact with this  medicine?    alcohol    aspirin    cidofovir    diuretics    lithium    methotrexate    other drugs for inflammation like ketorolac or prednisone    pemetrexed    probenecid    warfarin  What if I miss a dose?  If you miss a dose, take it as soon as you can. If it is almost time for your next dose, take only that dose. Do not take double or extra doses.  Where should I keep my medicine?  Keep out of the reach of children.  Store at room temperature between 15 and 30 degrees C (59 and 86 degrees F). Keep container tightly closed. Throw away any unused medicine after the expiration date.  What should I tell my health care provider before I take this medicine?  They need to know if you have any of these conditions:    cigarette smoker    coronary artery bypass graft (CABG) surgery within the past 2 weeks    drink more than 3 alcohol-containing drinks a day    heart disease    high blood pressure    history of stomach bleeding    kidney disease    liver disease    lung or breathing disease, like asthma    an unusual or allergic reaction to naproxen, aspirin, other NSAIDs, other medicines, foods, dyes, or preservatives    pregnant or trying to get pregnant    breast-feeding  What should I watch for while using this medicine?  Tell your doctor or health care professional if your pain does not get better. Talk to your doctor before taking another medicine for pain. Do not treat yourself.  This medicine does not prevent heart attack or stroke. In fact, this medicine may increase the chance of a heart attack or stroke. The chance may increase with longer use of this medicine and in people who have heart disease. If you take aspirin to prevent heart attack or stroke, talk with your doctor or health care professional.  Do not take other medicines that contain aspirin, ibuprofen, or naproxen with this medicine. Side effects such as stomach upset, nausea, or ulcers may be more likely to occur. Many medicines available without a  prescription should not be taken with this medicine.  This medicine can cause ulcers and bleeding in the stomach and intestines at any time during treatment. Do not smoke cigarettes or drink alcohol. These increase irritation to your stomach and can make it more susceptible to damage from this medicine. Ulcers and bleeding can happen without warning symptoms and can cause death.  You may get drowsy or dizzy. Do not drive, use machinery, or do anything that needs mental alertness until you know how this medicine affects you. Do not stand or sit up quickly, especially if you are an older patient. This reduces the risk of dizzy or fainting spells.  This medicine can cause you to bleed more easily. Try to avoid damage to your teeth and gums when you brush or floss your teeth.    Medicine is given to help treat or prevent illness. But if you don t take it correctly, it might not help. It might even harm you. Your healthcare provider or pharmacist can help you learn the right way to take your medicine. Listed below are some tips to help you take medicine safely.  Safety tips    Have a routine for taking each medicine. Make it part of something you do each day, such as brushing your teeth or eating a meal.    When you go to the hospital or your healthcare provider s office, bring all your current medicines in their original boxes or bottles. If you can t do that, bring an up-to-date list of your medicines.    Don't stop taking a prescription medicine unless your healthcare provider tells you to. Doing so could make your condition worse.    Don't share medicines.    Let your healthcare provider and pharmacist know of any allergies you have.    Taking prescription medicines with alcohol, street drugs, herbs, supplements, or even some over-the-counter medicines can be harmful. Talk to your healthcare provider or pharmacist before using any of these things while taking a prescription medicine.    When filling your  prescriptions, try using the same pharmacy for all your medicines. If that isn't possible, let each pharmacist know what medicines you are already taking.    Keep medicines out of the reach of children and pets. Store medicines in a cool, dry, dark place -- not in the bathroom or in the kitchen near moisture or heat.    Don't use medicine that has  or that doesn t look or smell right. Call your pharmacist for instructions on how to dispose of your medicines or where you can take them for safe disposal.    Medicine that comes in a container for a single dose should be used only 1 time. If you use the container a second time, it may have germs in it that can cause illness. These illnesses include hepatitis B and C. They also include infections of the brain or spinal cord (meningitis and epidural abscess).

## 2022-02-17 PROBLEM — L72.3 SEBACEOUS CYST: Status: ACTIVE | Noted: 2019-11-04

## 2022-06-26 ENCOUNTER — HEALTH MAINTENANCE LETTER (OUTPATIENT)
Age: 47
End: 2022-06-26

## 2022-08-02 ENCOUNTER — TELEPHONE (OUTPATIENT)
Dept: FAMILY MEDICINE | Facility: CLINIC | Age: 47
End: 2022-08-02

## 2022-08-02 NOTE — TELEPHONE ENCOUNTER
Reason for call: Reschedule appointment     Attempt to reach: 1st    Outcome:Left voicemail    Detailed message left? Yes    Please return call to [facility]     Clinic phone number [phone]

## 2022-08-21 ENCOUNTER — HEALTH MAINTENANCE LETTER (OUTPATIENT)
Age: 47
End: 2022-08-21

## 2022-10-23 ASSESSMENT — ENCOUNTER SYMPTOMS
CONSTIPATION: 0
HEADACHES: 0
PARESTHESIAS: 0
CHILLS: 0
BREAST MASS: 0
COUGH: 0
ARTHRALGIAS: 0
DYSURIA: 0
DIZZINESS: 0
FEVER: 0
JOINT SWELLING: 0
SHORTNESS OF BREATH: 0
ABDOMINAL PAIN: 0
PALPITATIONS: 0
NERVOUS/ANXIOUS: 0
FREQUENCY: 0
NAUSEA: 0
SORE THROAT: 0
EYE PAIN: 0
MYALGIAS: 0
HEMATURIA: 0
HEARTBURN: 0
WEAKNESS: 0
DIARRHEA: 0
HEMATOCHEZIA: 0

## 2022-10-24 ENCOUNTER — OFFICE VISIT (OUTPATIENT)
Dept: FAMILY MEDICINE | Facility: CLINIC | Age: 47
End: 2022-10-24
Payer: COMMERCIAL

## 2022-10-24 VITALS
BODY MASS INDEX: 28.29 KG/M2 | TEMPERATURE: 99 F | HEIGHT: 65 IN | RESPIRATION RATE: 16 BRPM | HEART RATE: 80 BPM | SYSTOLIC BLOOD PRESSURE: 136 MMHG | WEIGHT: 169.8 LBS | OXYGEN SATURATION: 97 % | DIASTOLIC BLOOD PRESSURE: 94 MMHG

## 2022-10-24 DIAGNOSIS — Z12.11 SCREENING FOR COLON CANCER: ICD-10-CM

## 2022-10-24 DIAGNOSIS — I10 BENIGN ESSENTIAL HYPERTENSION: ICD-10-CM

## 2022-10-24 DIAGNOSIS — Z00.00 ROUTINE GENERAL MEDICAL EXAMINATION AT A HEALTH CARE FACILITY: Primary | ICD-10-CM

## 2022-10-24 DIAGNOSIS — Z11.51 SCREENING FOR HUMAN PAPILLOMAVIRUS: ICD-10-CM

## 2022-10-24 DIAGNOSIS — Z12.4 SCREENING FOR CERVICAL CANCER: ICD-10-CM

## 2022-10-24 PROCEDURE — 88175 CYTOPATH C/V AUTO FLUID REDO: CPT | Performed by: FAMILY MEDICINE

## 2022-10-24 PROCEDURE — 99396 PREV VISIT EST AGE 40-64: CPT | Performed by: FAMILY MEDICINE

## 2022-10-24 PROCEDURE — 87624 HPV HI-RISK TYP POOLED RSLT: CPT | Performed by: FAMILY MEDICINE

## 2022-10-24 ASSESSMENT — ENCOUNTER SYMPTOMS
HEMATOCHEZIA: 0
ABDOMINAL PAIN: 0
BREAST MASS: 0
HEMATURIA: 0
CHILLS: 0
CONSTIPATION: 0
PARESTHESIAS: 0
HEARTBURN: 0
DYSURIA: 0
FREQUENCY: 0
PALPITATIONS: 0
NAUSEA: 0
SORE THROAT: 0
DIARRHEA: 0
MYALGIAS: 0
NERVOUS/ANXIOUS: 0
ARTHRALGIAS: 0
SHORTNESS OF BREATH: 0
WEAKNESS: 0
DIZZINESS: 0
HEADACHES: 0
FEVER: 0
EYE PAIN: 0
JOINT SWELLING: 0
COUGH: 0

## 2022-10-24 NOTE — PROGRESS NOTES
SUBJECTIVE:   CC: Debbie is an 47 year old who presents for preventive health visit.       Patient has been advised of split billing requirements and indicates understanding: Yes  Healthy Habits:     Getting at least 3 servings of Calcium per day:  Yes    Bi-annual eye exam:  Yes    Dental care twice a year:  Yes    Sleep apnea or symptoms of sleep apnea:  None    Diet:  Regular (no restrictions)    Frequency of exercise:  2-3 days/week    Duration of exercise:  15-30 minutes    Taking medications regularly:  Yes    Medication side effects:  None    PHQ-2 Total Score: 0    Additional concerns today:  Yes      Hypertension Follow-up    Do you check your blood pressure regularly outside of the clinic? No     Are you following a low salt diet? Yes    Are your blood pressures ever more than 140 on the top number (systolic) OR more   than 90 on the bottom number (diastolic), for example 140/90? No      Today's PHQ-2 Score:   PHQ-2 (  [employer]) 10/23/2022   Q1: Little interest or pleasure in doing things 0   Q2: Feeling down, depressed or hopeless 0   PHQ-2 Score 0   PHQ-2 Total Score (12-17 Years)- Positive if 3 or more points; Administer PHQ-A if positive -   Q1: Little interest or pleasure in doing things Not at all   Q2: Feeling down, depressed or hopeless Not at all   PHQ-2 Score 0       Abuse: Current or Past (Physical, Sexual or Emotional) - No  Do you feel safe in your environment? Yes    Have you ever done Advance Care Planning? (For example, a Health Directive, POLST, or a discussion with a medical provider or your loved ones about your wishes): No, advance care planning information given to patient to review.  Advanced care planning was discussed at today's visit.    Social History     Tobacco Use     Smoking status: Former     Packs/day: 0.00     Types: Cigarettes     Quit date: 2008     Years since quittin.2     Smokeless tobacco: Never     Tobacco comments:     5 cigs a day   Substance Use  Topics     Alcohol use: Yes     Alcohol/week: 0.0 standard drinks     Comment: 2 drinks a week     If you drink alcohol do you typically have >3 drinks per day or >7 drinks per week? No    Alcohol Use 10/23/2022   Prescreen: >3 drinks/day or >7 drinks/week? No     Reviewed orders with patient.  Reviewed health maintenance and updated orders accordingly - Yes  BP Readings from Last 3 Encounters:   10/24/22 (!) 136/94   21 107/72   21 125/84    Wt Readings from Last 3 Encounters:   10/24/22 77 kg (169 lb 12.8 oz)   22 73.5 kg (162 lb)   21 73.5 kg (162 lb)                  Patient Active Problem List   Diagnosis     Abnormal Pap smear and cervical HPV (human papillomavirus)     Benign essential hypertension     Seasonal allergic rhinitis     Elevated blood pressure affecting pregnancy in third trimester, antepartum     IUD (intrauterine device) in place     Sebaceous cyst     Gallstones     Plantar fasciitis, bilateral     Equinus contracture of ankle     Past Surgical History:   Procedure Laterality Date     DAVINCI LAPAROSCOPIC CHOLECYSTECTOMY WITHOUT GRAMS N/A 2021    Procedure: CHOLECYSTECTOMY, ROBOT-ASSISTED, LAPAROSCOPIC, WITHOUT CHOLANGIOGRAM;  Surgeon: Mikael Guerrero MD;  Location: [facility] OR     MOHS MICROGRAPHIC PROCEDURE Left 2020    basal cell carcinoma of left lower eyelid     TONSILLECTOMY & ADENOIDECTOMY       [facility] CLOSED TX ELBOW DISLOCATION W/O ANESTHESIA         Social History     Tobacco Use     Smoking status: Former     Packs/day: 0.00     Types: Cigarettes     Quit date: 2008     Years since quittin.2     Smokeless tobacco: Never     Tobacco comments:     5 cigs a day   Substance Use Topics     Alcohol use: Yes     Alcohol/week: 0.0 standard drinks     Comment: 2 drinks a week     Family History   Problem Relation Age of Onset     Hypertension Father      Breast Cancer Paternal Grandmother      Thyroid Disease Maternal Grandmother       Depression Sister      Melanoma No family hx of            Breast Cancer Screening:  Any new diagnosis of family breast, ovarian, or bowel cancer? No    FHS-7: No flowsheet data found.    Mammogram Screening: Recommended annual mammography  Pertinent mammograms are reviewed under the imaging tab.    History of abnormal Pap smear: NO - age 30-65 PAP every 5 years with negative HPV co-testing recommended  PAP / HPV Latest Ref Rng & Units 2/6/2017 2/26/2013   PAP (Historical) - NIL NIL   HPV16 NEG Negative -   HPV18 NEG Negative -   HRHPV NEG Negative -     Reviewed and updated as needed this visit by clinical staff   Tobacco  Allergies  Meds   Med Hx  Surg Hx  Fam Hx  Soc Hx        Reviewed and updated as needed this visit by Provider                 Past Medical History:   Diagnosis Date     Hypertension     borderline high BP few years back while on OCP, now normal.     Hypertension in pregnancy, pre-existing 2/26/2013    Mild diastolic HTN - documented prior to pregnancy.  Baseline labs checked and normal.  Baseline 24-hr urine protein = 0.07g/24hr.      Mastitis, acute 10/2013     Preeclampsia complicating hypertension 10/2013    IOL at term     Skin cancer of eyelid, left 01/2020      Past Surgical History:   Procedure Laterality Date     DAVINCI LAPAROSCOPIC CHOLECYSTECTOMY WITHOUT GRAMS N/A 8/16/2021    Procedure: CHOLECYSTECTOMY, ROBOT-ASSISTED, LAPAROSCOPIC, WITHOUT CHOLANGIOGRAM;  Surgeon: Mikael Guerrero MD;  Location: [facility] [place] OR     MOHS MICROGRAPHIC PROCEDURE Left 01/2020    basal cell carcinoma of left lower eyelid     TONSILLECTOMY & ADENOIDECTOMY  1993     [facility] CLOSED TX ELBOW DISLOCATION W/O ANESTHESIA  1990       Review of Systems   Constitutional: Negative for chills and fever.   HENT: Positive for congestion. Negative for ear pain, hearing loss and sore throat.    Eyes: Negative for pain and visual disturbance.   Respiratory: Negative for cough and shortness of breath.    Cardiovascular:  "Negative for chest pain, palpitations and peripheral edema.   Gastrointestinal: Negative for abdominal pain, constipation, diarrhea, heartburn, hematochezia and nausea.   Breasts:  Negative for tenderness, breast mass and discharge.   Genitourinary: Negative for dysuria, frequency, genital sores, hematuria, pelvic pain, urgency, vaginal bleeding and vaginal discharge.   Musculoskeletal: Negative for arthralgias, joint swelling and myalgias.   Skin: Negative for rash.   Neurological: Negative for dizziness, weakness, headaches and paresthesias.   Psychiatric/Behavioral: Negative for mood changes. The patient is not nervous/anxious.         OBJECTIVE:   BP (!) 131/95   Pulse 80   Temp 99  F (37.2  C) (Oral)   Resp 16   Ht 1.651 m (5' 5\")   Wt 77 kg (169 lb 12.8 oz)   SpO2 97%   BMI 28.26 kg/m    Physical Exam  GENERAL: healthy, alert and no distress  EYES: Eyes grossly normal to inspection, PERRL and conjunctivae and sclerae normal  HENT: ear canals and TM's normal, nose and mouth without ulcers or lesions  NECK: no adenopathy, no asymmetry, masses, or scars and thyroid normal to palpation  RESP: lungs clear to auscultation - no rales, rhonchi or wheezes  BREAST: normal without masses, tenderness or nipple discharge and no palpable axillary masses or adenopathy  CV: regular rate and rhythm, normal S1 S2, no S3 or S4, no murmur, click or rub, no peripheral edema and peripheral pulses strong  ABDOMEN: soft, nontender, no hepatosplenomegaly, no masses and bowel sounds normal  MS: no gross musculoskeletal defects noted, no edema  SKIN: no suspicious lesions or rashes  : NEFG, normal cervix, no discharge  NEURO: Normal strength and tone, mentation intact and speech normal  PSYCH: mentation appears normal, affect normal/bright    Labs pending    ASSESSMENT/PLAN:   (Z00.00) Routine general medical examination at a health care facility  (primary encounter diagnosis)  Comment: Discussed all pending HM; updated pap " "today, cologuard ordered  Plan: Annual follow up    (Z12.4) Screening for cervical cancer  (Z11.51) Screening for human papillomavirus  Comment: updated, routine screening  Plan: PAP screen with HPV - recommended age 30 - 65         years, HPV Hold (Lab Only), HPV High Risk Types        DNA Cervical    (I10) Benign essential hypertension  Comment: BP stable; recommend checking home BP weekly  Plan: will treat if consistently >140/90 (not currently the case); start with HCTZ    (Z12.11) Screening for colon cancer  Comment:   Plan: COLOGUARD([employer])      COUNSELING:  Reviewed preventive health counseling, as reflected in patient instructions    Estimated body mass index is 28.26 kg/m  as calculated from the following:    Height as of this encounter: 1.651 m (5' 5\").    Weight as of this encounter: 77 kg (169 lb 12.8 oz).    Weight management plan: Discussed healthy diet and exercise guidelines    She reports that she quit smoking about 14 years ago. Her smoking use included cigarettes. She has never used smokeless tobacco.      Counseling Resources:  ATP IV Guidelines  Pooled Cohorts Equation Calculator  Breast Cancer Risk Calculator  BRCA-Related Cancer Risk Assessment: FHS-7 Tool  FRAX Risk Assessment  ICSI Preventive Guidelines  Dietary Guidelines for Americans, 2010  USDA's MyPlate  ASA Prophylaxis  Lung CA Screening    Nneka Hylton MD  [facility]  "

## 2022-10-26 LAB
BKR LAB AP GYN ADEQUACY: NORMAL
BKR LAB AP GYN INTERPRETATION: NORMAL
BKR LAB AP GYN OTHER FINDINGS: NORMAL
BKR LAB AP HPV REFLEX: NORMAL
BKR LAB AP PREVIOUS ABNORMAL: NORMAL
PATH REPORT.COMMENTS IMP SPEC: NORMAL
PATH REPORT.COMMENTS IMP SPEC: NORMAL
PATH REPORT.RELEVANT HX SPEC: NORMAL

## 2022-10-28 LAB
HUMAN PAPILLOMA VIRUS 16 DNA: NEGATIVE
HUMAN PAPILLOMA VIRUS 18 DNA: NEGATIVE
HUMAN PAPILLOMA VIRUS FINAL DIAGNOSIS: NORMAL
HUMAN PAPILLOMA VIRUS OTHER HR: NEGATIVE

## 2022-11-11 LAB — NONINV COLON CA DNA+OCC BLD SCRN STL QL: NEGATIVE

## 2023-07-09 ENCOUNTER — HEALTH MAINTENANCE LETTER (OUTPATIENT)
Age: 48
End: 2023-07-09

## 2023-09-05 ENCOUNTER — HOSPITAL ENCOUNTER (OUTPATIENT)
Dept: MAMMOGRAPHY | Facility: CLINIC | Age: 48
Discharge: HOME | End: 2023-09-05
Attending: FAMILY MEDICINE | Admitting: FAMILY MEDICINE
Payer: COMMERCIAL

## 2023-09-05 DIAGNOSIS — Z12.31 ENCOUNTER FOR SCREENING MAMMOGRAM FOR BREAST CANCER: ICD-10-CM

## 2023-09-05 PROCEDURE — 77067 SCR MAMMO BI INCL CAD: CPT

## 2023-10-02 ENCOUNTER — IMMUNIZATION (OUTPATIENT)
Dept: NURSING | Facility: CLINIC | Age: 48
End: 2023-10-02
Payer: COMMERCIAL

## 2023-10-02 PROCEDURE — 90471 IMMUNIZATION ADMIN: CPT

## 2023-10-02 PROCEDURE — 91320 SARSCV2 VAC 30MCG TRS-SUC IM: CPT

## 2023-10-02 PROCEDURE — 90686 IIV4 VACC NO PRSV 0.5 ML IM: CPT

## 2023-10-02 PROCEDURE — 90480 ADMN SARSCOV2 VAC 1/ONLY CMP: CPT

## 2023-11-26 ENCOUNTER — HEALTH MAINTENANCE LETTER (OUTPATIENT)
Age: 48
End: 2023-11-26

## 2023-12-13 ASSESSMENT — ENCOUNTER SYMPTOMS
PALPITATIONS: 0
CONSTIPATION: 0
BREAST MASS: 0
DIZZINESS: 0
NAUSEA: 0
EYE PAIN: 0
JOINT SWELLING: 0
HEMATOCHEZIA: 0
FEVER: 0
MYALGIAS: 0
PARESTHESIAS: 0
COUGH: 0
CHILLS: 0
WEAKNESS: 0
HEADACHES: 0
HEMATURIA: 0
SORE THROAT: 0
FREQUENCY: 0
NERVOUS/ANXIOUS: 0
SHORTNESS OF BREATH: 0
DYSURIA: 0
ARTHRALGIAS: 0
ABDOMINAL PAIN: 0
DIARRHEA: 0
HEARTBURN: 0

## 2023-12-14 ENCOUNTER — OFFICE VISIT (OUTPATIENT)
Dept: FAMILY MEDICINE | Facility: CLINIC | Age: 48
End: 2023-12-14
Payer: COMMERCIAL

## 2023-12-14 VITALS
HEIGHT: 64 IN | DIASTOLIC BLOOD PRESSURE: 88 MMHG | HEART RATE: 97 BPM | WEIGHT: 180 LBS | OXYGEN SATURATION: 98 % | SYSTOLIC BLOOD PRESSURE: 125 MMHG | BODY MASS INDEX: 30.73 KG/M2

## 2023-12-14 DIAGNOSIS — Z97.5 IUD (INTRAUTERINE DEVICE) IN PLACE: ICD-10-CM

## 2023-12-14 DIAGNOSIS — Z00.00 ROUTINE GENERAL MEDICAL EXAMINATION AT A HEALTH CARE FACILITY: Primary | ICD-10-CM

## 2023-12-14 DIAGNOSIS — I10 BENIGN ESSENTIAL HYPERTENSION: ICD-10-CM

## 2023-12-14 PROCEDURE — 99396 PREV VISIT EST AGE 40-64: CPT | Performed by: FAMILY MEDICINE

## 2023-12-14 ASSESSMENT — ENCOUNTER SYMPTOMS
BREAST MASS: 0
FEVER: 0
HEARTBURN: 0
DIARRHEA: 0
WEAKNESS: 0
JOINT SWELLING: 0
PALPITATIONS: 0
DYSURIA: 0
HEMATURIA: 0
MYALGIAS: 0
PARESTHESIAS: 0
ABDOMINAL PAIN: 0
COUGH: 0
HEMATOCHEZIA: 0
SHORTNESS OF BREATH: 0
EYE PAIN: 0
HEADACHES: 0
FREQUENCY: 0
NAUSEA: 0
NERVOUS/ANXIOUS: 0
CHILLS: 0
SORE THROAT: 0
ARTHRALGIAS: 0
CONSTIPATION: 0
DIZZINESS: 0

## 2023-12-14 NOTE — PROGRESS NOTES
SUBJECTIVE:   Debbie is a 48 year old, presenting for the following:  Annual Visit        Healthy Habits:     Getting at least 3 servings of Calcium per day:  Yes    Bi-annual eye exam:  Yes    Dental care twice a year:  Yes    Sleep apnea or symptoms of sleep apnea:  None    Diet:  Regular (no restrictions)    Frequency of exercise:  2-3 days/week    Duration of exercise:  15-30 minutes    Taking medications regularly:  Yes    Medication side effects:  Not applicable    Additional concerns today:  No      Today's PHQ-2 Score:       12/14/2023     4:00 PM   PHQ-2 ( 1999 [employer])   Q1: Little interest or pleasure in doing things 0   Q2: Feeling down, depressed or hopeless 0   PHQ-2 Score 0   Q1: Little interest or pleasure in doing things Not at all   Q2: Feeling down, depressed or hopeless Not at all   PHQ-2 Score 0       Mirena in place since 2017 - due for removal/replacement in 2025  Maybe 5 lb weight gain in past few years - ok with that  - staying active  - eats healthy  BP ok  - checks at home occasionally, SBP normal, DBP often in the 80s      Social History     Tobacco Use    Smoking status: Former     Packs/day: 0     Types: Cigarettes     Quit date: 8/14/2008     Years since quitting: 15.3    Smokeless tobacco: Never    Tobacco comments:     5 cigs a day   Substance Use Topics    Alcohol use: Yes     Alcohol/week: 0.0 standard drinks of alcohol     Comment: 2 drinks a week             12/13/2023     5:57 PM   Alcohol Use   Prescreen: >3 drinks/day or >7 drinks/week? No     Reviewed orders with patient.  Reviewed health maintenance and updated orders accordingly - Yes  BP Readings from Last 3 Encounters:   12/14/23 125/88   10/24/22 (!) 136/94   08/16/21 107/72    Wt Readings from Last 3 Encounters:   12/14/23 81.6 kg (180 lb)   10/24/22 77 kg (169 lb 12.8 oz)   01/28/22 73.5 kg (162 lb)                  Patient Active Problem List   Diagnosis    Abnormal Pap smear and cervical HPV (human  papillomavirus)    Benign essential hypertension    Seasonal allergic rhinitis    IUD (intrauterine device) in place    Sebaceous cyst    Gallstones    Plantar fasciitis, bilateral    Equinus contracture of ankle     Past Surgical History:   Procedure Laterality Date    DAVINCI LAPAROSCOPIC CHOLECYSTECTOMY WITHOUT GRAMS N/A 8/16/2021    Procedure: CHOLECYSTECTOMY, ROBOT-ASSISTED, LAPAROSCOPIC, WITHOUT CHOLANGIOGRAM;  Surgeon: Mikael Guerrero MD;  Location: [facility] OR    MOHS MICROGRAPHIC PROCEDURE Left 01/2020    basal cell carcinoma of left lower eyelid    TONSILLECTOMY & ADENOIDECTOMY  1993    [facility] CLOSED TX ELBOW DISLOCATION W/O ANESTHESIA  1990       Social History     Tobacco Use    Smoking status: Former     Packs/day: 0     Types: Cigarettes     Quit date: 8/14/2008     Years since quitting: 15.3    Smokeless tobacco: Never    Tobacco comments:     5 cigs a day   Substance Use Topics    Alcohol use: Yes     Alcohol/week: 0.0 standard drinks of alcohol     Comment: 2 drinks a week     Family History   Problem Relation Age of Onset    Hypertension Father     Breast Cancer Paternal Grandmother     Thyroid Disease Maternal Grandmother     Depression Sister     Melanoma No family hx of            Breast Cancer Screening:    FHS-7:       9/5/2023     2:51 PM   Breast CA Risk Assessment (FHS-7)   Did any of your first-degree relatives have breast or ovarian cancer? No   Did any of your relatives have bilateral breast cancer? No   Did any man in your family have breast cancer? No   Did any woman in your family have breast and ovarian cancer? No   Did any woman in your family have breast cancer before age 50 y? No   Do you have 2 or more relatives with breast and/or ovarian cancer? No   Do you have 2 or more relatives with breast and/or bowel cancer? No       Mammogram Screening: Recommended annual mammography  Pertinent mammograms are reviewed under the imaging tab.    History of abnormal Pap smear: NO - age  30-65 PAP every 5 years with negative HPV co-testing recommended      Latest Ref Rng & Units 10/24/2022    11:32 AM 2/6/2017     3:57 PM 2/26/2013     4:42 PM   PAP / HPV   PAP  Negative for Intraepithelial Lesion or Malignancy (NILM)      PAP (Historical)   NIL  NIL    HPV 16 DNA Negative Negative  Negative     HPV 18 DNA Negative Negative  Negative     Other HR HPV Negative Negative  Negative       Reviewed and updated as needed this visit by clinical staff   Tobacco  Allergies  Meds   Med Hx  Surg Hx  Fam Hx          Reviewed and updated as needed this visit by Provider   Tobacco     Med Hx  Surg Hx  Fam Hx         Past Medical History:   Diagnosis Date    Hypertension     borderline high BP few years back while on OCP, now normal.    Hypertension in pregnancy, pre-existing 2/26/2013    Mild diastolic HTN - documented prior to pregnancy.  Baseline labs checked and normal.  Baseline 24-hr urine protein = 0.07g/24hr.     Mastitis, acute 10/2013    Preeclampsia complicating hypertension 10/2013    IOL at term    Skin cancer of eyelid, left 01/2020      Past Surgical History:   Procedure Laterality Date    DAVINCI LAPAROSCOPIC CHOLECYSTECTOMY WITHOUT GRAMS N/A 8/16/2021    Procedure: CHOLECYSTECTOMY, ROBOT-ASSISTED, LAPAROSCOPIC, WITHOUT CHOLANGIOGRAM;  Surgeon: Mikael Guerrero MD;  [place] [facility] [place]    MOHS MICROGRAPHIC PROCEDURE Left 01/2020    basal cell carcinoma of left lower eyelid    TONSILLECTOMY & ADENOIDECTOMY  1993    [facility] CLOSED TX ELBOW DISLOCATION W/O ANESTHESIA  1990       Review of Systems   Constitutional:  Negative for chills and fever.   HENT:  Negative for congestion, ear pain, hearing loss and sore throat.    Eyes:  Negative for pain and visual disturbance.   Respiratory:  Negative for cough and shortness of breath.    Cardiovascular:  Negative for chest pain, palpitations and peripheral edema.   Gastrointestinal:  Negative for abdominal pain, constipation, diarrhea, heartburn,  "hematochezia and nausea.   Breasts:  Negative for tenderness, breast mass and discharge.   Genitourinary:  Negative for dysuria, frequency, genital sores, hematuria, pelvic pain, urgency, vaginal bleeding and vaginal discharge.   Musculoskeletal:  Negative for arthralgias, joint swelling and myalgias.   Skin:  Negative for rash.   Neurological:  Negative for dizziness, weakness, headaches and paresthesias.   Psychiatric/Behavioral:  Negative for mood changes. The patient is not nervous/anxious.           OBJECTIVE:   /88   Pulse 97   Ht 1.626 m (5' 4\")   Wt 81.6 kg (180 lb)   SpO2 98%   BMI 30.90 kg/m    Physical Exam  GENERAL: healthy, alert and no distress  EYES: Eyes grossly normal to inspection, PERRL and conjunctivae and sclerae normal  HENT: ear canals and TM's normal, nose and mouth without ulcers or lesions  NECK: no adenopathy, no asymmetry, masses, or scars and thyroid normal to palpation  RESP: lungs clear to auscultation - no rales, rhonchi or wheezes  BREAST: deferred today  CV: regular rate and rhythm, normal S1 S2, no S3 or S4, no murmur, click or rub, no peripheral edema and peripheral pulses strong  ABDOMEN: soft, nontender, no hepatosplenomegaly, no masses and bowel sounds normal  MS: no gross musculoskeletal defects noted, no edema  SKIN: no suspicious lesions or rashes  NEURO: Normal strength and tone, mentation intact and speech normal  PSYCH: mentation appears normal, affect normal/bright      ASSESSMENT/PLAN:   (Z00.00) Routine general medical examination at a health care facility  (primary encounter diagnosis)  Comment: UTD with labs and screening; next pap in 2027, mammo this past summer  Plan: annual follow up    (I10) Benign essential hypertension  Comment: diastolic only; not on medication  Plan: exercise and diet management unless systaolic increases    (Z97.5) IUD (intrauterine device) in place  Comment: not due for removal until 2025      COUNSELING:  Reviewed preventive " "health counseling, as reflected in patient instructions      BMI:   Estimated body mass index is 30.9 kg/m  as calculated from the following:    Height as of this encounter: 1.626 m (5' 4\").    Weight as of this encounter: 81.6 kg (180 lb).       She reports that she quit smoking about 15 years ago. Her smoking use included cigarettes. She has never used smokeless tobacco.          Nneka Hylton MD  [facility]  "

## 2024-09-16 ENCOUNTER — HOSPITAL ENCOUNTER (OUTPATIENT)
Dept: MAMMOGRAPHY | Facility: CLINIC | Age: 49
Discharge: HOME | End: 2024-09-16
Attending: FAMILY MEDICINE | Admitting: FAMILY MEDICINE
Payer: COMMERCIAL

## 2024-09-16 DIAGNOSIS — Z12.31 VISIT FOR SCREENING MAMMOGRAM: ICD-10-CM

## 2024-09-16 PROCEDURE — 77063 BREAST TOMOSYNTHESIS BI: CPT

## 2024-10-19 ENCOUNTER — IMMUNIZATION (OUTPATIENT)
Dept: FAMILY MEDICINE | Facility: CLINIC | Age: 49
End: 2024-10-19
Payer: COMMERCIAL

## 2024-10-19 PROCEDURE — 90656 IIV3 VACC NO PRSV 0.5 ML IM: CPT

## 2024-10-19 PROCEDURE — 90471 IMMUNIZATION ADMIN: CPT

## 2024-11-14 ENCOUNTER — PATIENT OUTREACH (OUTPATIENT)
Dept: CARE COORDINATION | Facility: CLINIC | Age: 49
End: 2024-11-14
Payer: COMMERCIAL

## 2024-11-28 ENCOUNTER — PATIENT OUTREACH (OUTPATIENT)
Dept: CARE COORDINATION | Facility: CLINIC | Age: 49
End: 2024-11-28
Payer: COMMERCIAL

## 2025-02-02 ENCOUNTER — HEALTH MAINTENANCE LETTER (OUTPATIENT)
Age: 50
End: 2025-02-02

## 2025-02-10 ENCOUNTER — OFFICE VISIT (OUTPATIENT)
Dept: FAMILY MEDICINE | Facility: CLINIC | Age: 50
End: 2025-02-10
Payer: COMMERCIAL

## 2025-02-10 VITALS
WEIGHT: 179.6 LBS | OXYGEN SATURATION: 97 % | HEIGHT: 64 IN | RESPIRATION RATE: 16 BRPM | DIASTOLIC BLOOD PRESSURE: 89 MMHG | TEMPERATURE: 98.2 F | SYSTOLIC BLOOD PRESSURE: 138 MMHG | HEART RATE: 85 BPM | BODY MASS INDEX: 30.66 KG/M2

## 2025-02-10 DIAGNOSIS — Z97.5 IUD (INTRAUTERINE DEVICE) IN PLACE: ICD-10-CM

## 2025-02-10 DIAGNOSIS — Z30.433 ENCOUNTER FOR REMOVAL AND REINSERTION OF INTRAUTERINE CONTRACEPTIVE DEVICE: Primary | ICD-10-CM

## 2025-02-10 LAB
ANION GAP SERPL CALCULATED.3IONS-SCNC: 9 MMOL/L (ref 7–15)
BUN SERPL-MCNC: 11.8 MG/DL (ref 6–20)
CALCIUM SERPL-MCNC: 9 MG/DL (ref 8.8–10.4)
CHLORIDE SERPL-SCNC: 106 MMOL/L (ref 98–107)
CHOLEST SERPL-MCNC: 143 MG/DL
CREAT SERPL-MCNC: 0.71 MG/DL (ref 0.51–0.95)
EGFRCR SERPLBLD CKD-EPI 2021: >90 ML/MIN/1.73M2
EST. AVERAGE GLUCOSE BLD GHB EST-MCNC: 111 MG/DL
FASTING STATUS PATIENT QL REPORTED: YES
FASTING STATUS PATIENT QL REPORTED: YES
GLUCOSE SERPL-MCNC: 96 MG/DL (ref 70–99)
HBA1C MFR BLD: 5.5 % (ref 0–5.6)
HCO3 SERPL-SCNC: 23 MMOL/L (ref 22–29)
HDLC SERPL-MCNC: 46 MG/DL
LDLC SERPL CALC-MCNC: 76 MG/DL
NONHDLC SERPL-MCNC: 97 MG/DL
POTASSIUM SERPL-SCNC: 4.2 MMOL/L (ref 3.4–5.3)
SODIUM SERPL-SCNC: 138 MMOL/L (ref 135–145)
TRIGL SERPL-MCNC: 107 MG/DL

## 2025-02-10 RX ORDER — IBUPROFEN 200 MG
600 TABLET ORAL ONCE
Status: COMPLETED | OUTPATIENT
Start: 2025-02-10 | End: 2025-02-10

## 2025-02-10 RX ADMIN — Medication 200 MG: at 08:55

## 2025-02-10 NOTE — PATIENT INSTRUCTIONS
Patient Education   Here is the plan from today's visit    1. Encounter for removal and reinsertion of intrauterine contraceptive device (Primary)  Please follow up in 1 month if you would like us to check your string length. You should check string height once per month to ensure the string height does not change. Your Mirena will be good for 8 years.   - levonorgestrel (MIRENA) 52 MG (20 mcg/day) IUD 1 each  - INSERTION INTRAUTERINE DEVICE  - REMOVE INTRAUTERINE DEVICE  - ibuprofen (ADVIL/MOTRIN) tablet 600 mg      Please call or return to clinic if your symptoms don't go away.    Follow up plan  No follow-ups on file.    Thank you for coming to [facility] today.  Lab Testing:  **If you had lab testing today and your results are reassuring or normal they will be mailed to you or sent through [facility] within 7 days.   **If the lab tests need quick action we will call you with the results.  **If you are having labs done on a different day, please call [phone] to schedule at [facility] or [phone] for other [facility] locations. Labs do not offer walk-in appointments.  The phone number we will call with results is # [phone] (home) . If this is not the best number please call our clinic and change the number.  Medication Refills:  If you need any refills please call your pharmacy and they will contact us.   If you need to  your refill at a new pharmacy, please contact the new pharmacy directly. The new pharmacy will help you get your medications transferred faster.   Scheduling:  If you have any concerns about today's visit or wish to schedule another appointment please call our office during normal business hours [phone] (8-5:00 M-F). If you can no longer make a scheduled visit, please cancel via [facility] or call us to cancel.   If a referral was made to an [facility] specialty provider and you do not get a call from central scheduling, please refer to  directions on your visit summary or call our office during normal business hours for assistance.   If a Mammogram was ordered for you at [facility] call [phone] to schedule or change your appointment.  If you had an XRay/CT/Ultrasound/MRI ordered the number is [phone] to schedule or change your radiology appointment.   [facility] has limited ultrasound appointments available on Wednesdays, if you would like your ultrasound at [facility], please call [phone] to schedule.   Medical Concerns:  If you have urgent medical concerns please call [phone] at any time of the day.    Jeremy Hernandez MD             IUD AFTERCARE INSTRUCTIONS     Uterine cramping is common after IUD placement. You can help relieve the discomfort with heating pads, Tylenol (acetaminophen), Aspirin or Advil (ibuprofen). If your cramping becomes very painful, please call the clinic.     Irregular bleeding and spotting is normal for the first few months after the IUD is placed. In some cases, women may experience irregular bleeding or spotting for up to six months after the IUD is placed. This bleeding can be annoying at first but usually will become lighter with the Mirena IUD quickly. Call the clinic if your bleeding is excessive and not getting better.     Your period will likely be shorter and lighter with a Mirena IUD. Approximately 40% of people will stop having periods altogether with the Mirena IUD. Your period may be heavier and longer with the Paragard IUD.     IUDs do not protect against sexually transmitted infections including the AIDS virus (HIV), warts (HPV), gonorrhea, Chlamydia, and herpes. Condoms should be used to decrease the risk sexually transmitted infections. If you think that you have been exposed to a sexually transmitted infection, please call the clinic.     If you had the IUD placed for birth control, the Paragard IUD is effective immediately. The Mirena IUD is effective immediately if  it was inserted within seven days after the start of your period. If you have Mirena inserted at any other time during your menstrual cycle, use another method of birth control, like condoms for at least 7 days.     It is possible for the IUD to come out of the uterus. If it does slip out of place, it is most likely to happen in the first few months after being put in. To make sure your IUD is in place, you can feel for the IUD strings between periods. To check for strings, wash your hands. Then, sit or squat down. Place one finger into your vagina until you feel your cervix. It will feel hard and rubbery, like the end of your nose. The string ends should be coming through your cervix. Do not pull on the strings. If the strings feel much longer than before, if you feel the hard plastic part of the IUD, or if you cannot feel the strings at all, the IUD may have moved out of place. Please call the clinic and consider using a back up form of birth control until you are seen.     Keep your follow-up appointment for 4-6 weeks after the IUD has been placed.     Pregnancy is unlikely after IUD placement, but can happen. If you have early pregnancy symptoms like nausea and vomiting, breast tenderness, frequent urination or abdominal pain, you can take a pregnancy test. Please call the clinic if you have any concerns or if your pregnancy test is positive.     The IUD should only be removed by a healthcare provider.   The Mirena IUD should be removed and/or replaced after 8 years.   The Paragard IUD should be removed and/or replaced after 12 years.     Warning Signs   Call the clinic if any of the following occurs:      Severe abdominal pain or cramping      Unusual bleeding      Fever or chills      Foul smelling vaginal discharge      Painful intercourse      Positive pregnancy test.         Scheduling:  If you have any concerns about today's visit or wish to schedule another appointment please call our office during normal  business hours [phone] (8-5:00 M-F)  If a referral was made to a [facility] and you don't get a call from central scheduling please call [phone].  If a Mammogram was ordered for you at [facility] call [phone] to schedule or change your appointment.  If you had an XRay/CT/Ultrasound/MRI ordered the number is [phone] to schedule or change your radiology appointment.       IUD information:     Benefits: The IUD can be 97-99% effective when carefully following directions regarding use. It can be more effective if used with additional contraception. IUD containing progestin may decrease menstrual flow and menstrual cramping.     Risks/Side Effects: include but are not limited to spotting, bleeding, hemorrhage, or anemia: cramping or pain: partial or complete expulsion of device; lost IUD strings; uterine or cervical perforation; embedding of IUD in the uterine wall; increased risk of pelvic inflammatory disease. Women who become pregnant with an IUD in place are at a higher risk for ectopic pregnancy should a pregnancy occur with an IUD in situ. There is a higher rate of miscarriage when pregnancy occurs with IUD in place.    Warning signs: Please call clinic if you have abnormal spotting or heavy bleeding, abdominal pain, dyspareunia, fever, chills, flu like symptoms, or unable to locate strings of IUD, or strings are longer or shorter than expected.    You are encouraged to use condoms for prevention of STD. You may also need back up contraception for 7 days with your IUD. You may use pain medications (ibuprofen) as needed for mild to moderate pain. Please follow-up in clinic in 4-6 weeks for IUD string check if unable to find strings or as directed by provider.

## 2025-02-10 NOTE — PROGRESS NOTES
[facility]  IUD Removal and Insertion Note    Debbie Hood is a patient of Nneka Pandey here for an IUD insertion   Indication: Contraception    Counseling: Discussed potential side effects of Paraguard, including increased bleeding and cramping, as well as the Mirena, including unpredictable spotting and amenorrhea.  Patient aware to check for strings every month.    Consent: Affirmation of informed consent was signed and scanned into the medical record. Risks, benefits and alternatives were discussed. Patient's questions were elicited and answered.   Procedure safety checklist was completed:  Yes  Time Out (Pause for the Cause) completed: Yes    Labs: UPT not done (not indicated)    Preoperative Diagnosis: Contraception Device in situ  Postoperative Diagnosis: Contraception Device in situ    Technique:   Speculum inserted into the vagina, IUD strings were visualized. IUD extractor was used to grasp strings, patient was advised to breath while this provider gently pulled IUD out. The  T  arms of the IUD folded in during removal and the IUD slipped out through your cervix and vagina.  EBL: minimal  Complications:  No  Tolerance:  Pt tolerated procedure well and was in stable condition.   Patholgy sent: No  Patient was premedicated with ibuprofen:   No  Uterine position was confirmed by bimanual exam: Yes   Using a sterile speculum and equipment, the cervix was examined.     The cervix was cleansed with Betadine prep. A tenaculum was applied to the cervix with tension to straighten the cervical canal.  The uterus was sounded to 8cm.  A Mirena IUD was inserted in the usual fashion and strings trimmed 2cm below the cervix.  EBL: minimal  Complications: No  Tolerance: Pt tolerated procedure well and was in stable condition.  Was given 600 mg Ibuprofen for analgesia.     Follow up: Pt was instructed to call if fever, chills, heavy bleeding, severe cramping or foul smelling discharge. Pt was  instructed to call if bleeding continues more than two days, if they fill more than one pad every 2 hours, see clots from the vagina, experience severe pain or foul smell.May take 600 mg ibuprofen QID prn for mild cramping.  They were advised to check the IUD strings monthly and recommended to return in 1 month for IUD string check if ucertain.    Follow up in 4 weeks to check Mirena string height if preferred.    Resident: Jeremy Hernandez MD  Faculty: Nneka Hylton MD present for and supervised this entire procedure.

## 2025-02-10 NOTE — PROGRESS NOTES
Preceptor Attestation:    I discussed the patient with the resident and evaluated the patient in person. I was present for and supervised the entire procedure. I have verified the content of the note, which accurately reflects my assessment of the patient and the plan of care.   Supervising Physician:  Nneka Hylton MD.

## 2025-02-13 NOTE — RESULT ENCOUNTER NOTE
Hi again,    The rest of the labs look good - we can discuss in more detail when I see you today.    Sincerely,    Nneka Hylton MD

## (undated) DEVICE — DAVINCI XI SEAL UNIVERSAL 5-8MM 470361

## (undated) DEVICE — ESU PENCIL W/COATED BLADE E2450H

## (undated) DEVICE — BLADE CLIPPER SGL USE 9680

## (undated) DEVICE — PACK LAP CHOLE CUSTOM ASC

## (undated) DEVICE — DRSG GAUZE 4X4" 3033

## (undated) DEVICE — ENDO TROCAR FIRST ENTRY KII FIOS Z-THRD 05X100MM CTF03

## (undated) DEVICE — DRSG STERI STRIP 1/2X4" R1547

## (undated) DEVICE — SU MONOCRYL 4-0 PS-2 27" UND Y426H

## (undated) DEVICE — CLIP ENDO HEMO-LOC PURPLE LG 544240

## (undated) DEVICE — DRAPE MAYO STAND 23X54 8337

## (undated) DEVICE — GLOVE PROTEXIS POWDER FREE SMT 7.5  2D72PT75X

## (undated) DEVICE — PREP CHLORAPREP 26ML TINTED ORANGE  260815

## (undated) DEVICE — DAVINCI XI FCP BIPOLAR FENESTRATED 470205

## (undated) DEVICE — GOWN XLG DISP 9545

## (undated) DEVICE — TAPE MEDIPORE 4"X2YD 2864

## (undated) DEVICE — DAVINCI XI DRAPE ARM 470015

## (undated) DEVICE — LINEN TOWEL PACK X5 5464

## (undated) DEVICE — SU VICRYL 0 CT-2 27" J334H

## (undated) DEVICE — DAVINCI XI CAUTERY HOOK 470183

## (undated) DEVICE — ESU GROUND PAD ADULT W/CORD E7507

## (undated) DEVICE — DAVINCI XI CLIP APPLIER LG HEM-O-CLIP 8MM 470230

## (undated) DEVICE — DAVINCI XI DRAPE COLUMN 470341

## (undated) DEVICE — SOL WATER IRRIG 500ML BOTTLE 2F7113

## (undated) DEVICE — SPECIMEN CONTAINER W/10% BUFFERED FORMALIN 120ML 591201

## (undated) DEVICE — SYR 30ML SLIP TIP W/O NDL 302833

## (undated) RX ORDER — DEXAMETHASONE SODIUM PHOSPHATE 4 MG/ML
INJECTION, SOLUTION INTRA-ARTICULAR; INTRALESIONAL; INTRAMUSCULAR; INTRAVENOUS; SOFT TISSUE
Status: DISPENSED
Start: 2021-08-16

## (undated) RX ORDER — LIDOCAINE HYDROCHLORIDE 20 MG/ML
INJECTION, SOLUTION EPIDURAL; INFILTRATION; INTRACAUDAL; PERINEURAL
Status: DISPENSED
Start: 2021-08-16

## (undated) RX ORDER — ACETAMINOPHEN 325 MG/1
TABLET ORAL
Status: DISPENSED
Start: 2021-08-16

## (undated) RX ORDER — PROPOFOL 10 MG/ML
INJECTION, EMULSION INTRAVENOUS
Status: DISPENSED
Start: 2021-08-16

## (undated) RX ORDER — INDOCYANINE GREEN AND WATER 25 MG
KIT INJECTION
Status: DISPENSED
Start: 2021-08-16

## (undated) RX ORDER — CEFAZOLIN SODIUM 1 G/3ML
INJECTION, POWDER, FOR SOLUTION INTRAMUSCULAR; INTRAVENOUS
Status: DISPENSED
Start: 2021-08-16

## (undated) RX ORDER — FENTANYL CITRATE 50 UG/ML
INJECTION, SOLUTION INTRAMUSCULAR; INTRAVENOUS
Status: DISPENSED
Start: 2021-08-16

## (undated) RX ORDER — ONDANSETRON 4 MG/1
TABLET, ORALLY DISINTEGRATING ORAL
Status: DISPENSED
Start: 2021-08-16

## (undated) RX ORDER — FENTANYL CITRATE-0.9 % NACL/PF 10 MCG/ML
PLASTIC BAG, INJECTION (ML) INTRAVENOUS
Status: DISPENSED
Start: 2021-08-16

## (undated) RX ORDER — ONDANSETRON 2 MG/ML
INJECTION INTRAMUSCULAR; INTRAVENOUS
Status: DISPENSED
Start: 2021-08-16

## (undated) RX ORDER — OXYCODONE HYDROCHLORIDE 5 MG/1
TABLET ORAL
Status: DISPENSED
Start: 2021-08-16